# Patient Record
Sex: FEMALE | Race: WHITE | NOT HISPANIC OR LATINO | Employment: UNEMPLOYED | URBAN - METROPOLITAN AREA
[De-identification: names, ages, dates, MRNs, and addresses within clinical notes are randomized per-mention and may not be internally consistent; named-entity substitution may affect disease eponyms.]

---

## 2022-06-13 ENCOUNTER — OFFICE VISIT (OUTPATIENT)
Dept: FAMILY MEDICINE | Facility: CLINIC | Age: 67
End: 2022-06-13
Payer: COMMERCIAL

## 2022-06-13 ENCOUNTER — E-CONSULT (OUTPATIENT)
Dept: ENDOCRINOLOGY | Facility: CLINIC | Age: 67
End: 2022-06-13
Payer: COMMERCIAL

## 2022-06-13 VITALS
DIASTOLIC BLOOD PRESSURE: 76 MMHG | WEIGHT: 101 LBS | TEMPERATURE: 98 F | OXYGEN SATURATION: 98 % | HEIGHT: 62 IN | HEART RATE: 71 BPM | BODY MASS INDEX: 18.58 KG/M2 | SYSTOLIC BLOOD PRESSURE: 120 MMHG

## 2022-06-13 DIAGNOSIS — Z78.0 MENOPAUSE: ICD-10-CM

## 2022-06-13 DIAGNOSIS — Z00.00 ENCOUNTER FOR MEDICAL EXAMINATION TO ESTABLISH CARE: Primary | ICD-10-CM

## 2022-06-13 DIAGNOSIS — Z13.6 ENCOUNTER FOR LIPID SCREENING FOR CARDIOVASCULAR DISEASE: ICD-10-CM

## 2022-06-13 DIAGNOSIS — E27.8 ADRENAL HYPERPLASIA: ICD-10-CM

## 2022-06-13 DIAGNOSIS — L65.9 HAIR LOSS: ICD-10-CM

## 2022-06-13 DIAGNOSIS — E27.8 ADRENAL HYPERPLASIA: Primary | Chronic | ICD-10-CM

## 2022-06-13 DIAGNOSIS — Z13.220 ENCOUNTER FOR LIPID SCREENING FOR CARDIOVASCULAR DISEASE: ICD-10-CM

## 2022-06-13 DIAGNOSIS — Z23 NEED FOR VACCINATION: ICD-10-CM

## 2022-06-13 DIAGNOSIS — Z79.899 ENCOUNTER FOR LONG-TERM (CURRENT) USE OF MEDICATIONS: ICD-10-CM

## 2022-06-13 DIAGNOSIS — Z79.52 LONG TERM (CURRENT) USE OF SYSTEMIC STEROIDS: ICD-10-CM

## 2022-06-13 PROCEDURE — 1159F PR MEDICATION LIST DOCUMENTED IN MEDICAL RECORD: ICD-10-PCS | Mod: CPTII,S$GLB,, | Performed by: FAMILY MEDICINE

## 2022-06-13 PROCEDURE — 1160F RVW MEDS BY RX/DR IN RCRD: CPT | Mod: CPTII,S$GLB,, | Performed by: FAMILY MEDICINE

## 2022-06-13 PROCEDURE — 90471 IMMUNIZATION ADMIN: CPT | Mod: S$GLB,,, | Performed by: FAMILY MEDICINE

## 2022-06-13 PROCEDURE — 1159F MED LIST DOCD IN RCRD: CPT | Mod: CPTII,S$GLB,, | Performed by: FAMILY MEDICINE

## 2022-06-13 PROCEDURE — 99204 PR OFFICE/OUTPT VISIT, NEW, LEVL IV, 45-59 MIN: ICD-10-PCS | Mod: 25,S$GLB,, | Performed by: FAMILY MEDICINE

## 2022-06-13 PROCEDURE — 90715 TDAP VACCINE GREATER THAN OR EQUAL TO 7YO IM: ICD-10-PCS | Mod: S$GLB,,, | Performed by: FAMILY MEDICINE

## 2022-06-13 PROCEDURE — 1126F PR PAIN SEVERITY QUANTIFIED, NO PAIN PRESENT: ICD-10-PCS | Mod: CPTII,S$GLB,, | Performed by: FAMILY MEDICINE

## 2022-06-13 PROCEDURE — 99451 PR INTERPROF, PHONE/INTERNET/EHR, CONSULT, >= 5MINS: ICD-10-PCS | Mod: S$GLB,,, | Performed by: INTERNAL MEDICINE

## 2022-06-13 PROCEDURE — 3288F FALL RISK ASSESSMENT DOCD: CPT | Mod: CPTII,S$GLB,, | Performed by: FAMILY MEDICINE

## 2022-06-13 PROCEDURE — 3288F PR FALLS RISK ASSESSMENT DOCUMENTED: ICD-10-PCS | Mod: CPTII,S$GLB,, | Performed by: FAMILY MEDICINE

## 2022-06-13 PROCEDURE — 3008F PR BODY MASS INDEX (BMI) DOCUMENTED: ICD-10-PCS | Mod: CPTII,S$GLB,, | Performed by: FAMILY MEDICINE

## 2022-06-13 PROCEDURE — 99204 OFFICE O/P NEW MOD 45 MIN: CPT | Mod: 25,S$GLB,, | Performed by: FAMILY MEDICINE

## 2022-06-13 PROCEDURE — 1160F PR REVIEW ALL MEDS BY PRESCRIBER/CLIN PHARMACIST DOCUMENTED: ICD-10-PCS | Mod: CPTII,S$GLB,, | Performed by: FAMILY MEDICINE

## 2022-06-13 PROCEDURE — 3078F DIAST BP <80 MM HG: CPT | Mod: CPTII,S$GLB,, | Performed by: FAMILY MEDICINE

## 2022-06-13 PROCEDURE — 3008F BODY MASS INDEX DOCD: CPT | Mod: CPTII,S$GLB,, | Performed by: FAMILY MEDICINE

## 2022-06-13 PROCEDURE — 1126F AMNT PAIN NOTED NONE PRSNT: CPT | Mod: CPTII,S$GLB,, | Performed by: FAMILY MEDICINE

## 2022-06-13 PROCEDURE — 3074F SYST BP LT 130 MM HG: CPT | Mod: CPTII,S$GLB,, | Performed by: FAMILY MEDICINE

## 2022-06-13 PROCEDURE — 3074F PR MOST RECENT SYSTOLIC BLOOD PRESSURE < 130 MM HG: ICD-10-PCS | Mod: CPTII,S$GLB,, | Performed by: FAMILY MEDICINE

## 2022-06-13 PROCEDURE — 1101F PT FALLS ASSESS-DOCD LE1/YR: CPT | Mod: CPTII,S$GLB,, | Performed by: FAMILY MEDICINE

## 2022-06-13 PROCEDURE — 90715 TDAP VACCINE 7 YRS/> IM: CPT | Mod: S$GLB,,, | Performed by: FAMILY MEDICINE

## 2022-06-13 PROCEDURE — 90471 TDAP VACCINE GREATER THAN OR EQUAL TO 7YO IM: ICD-10-PCS | Mod: S$GLB,,, | Performed by: FAMILY MEDICINE

## 2022-06-13 PROCEDURE — 99999 PR PBB SHADOW E&M-NEW PATIENT-LVL V: CPT | Mod: PBBFAC,,, | Performed by: FAMILY MEDICINE

## 2022-06-13 PROCEDURE — 1101F PR PT FALLS ASSESS DOC 0-1 FALLS W/OUT INJ PAST YR: ICD-10-PCS | Mod: CPTII,S$GLB,, | Performed by: FAMILY MEDICINE

## 2022-06-13 PROCEDURE — 3078F PR MOST RECENT DIASTOLIC BLOOD PRESSURE < 80 MM HG: ICD-10-PCS | Mod: CPTII,S$GLB,, | Performed by: FAMILY MEDICINE

## 2022-06-13 PROCEDURE — 99999 PR PBB SHADOW E&M-NEW PATIENT-LVL V: ICD-10-PCS | Mod: PBBFAC,,, | Performed by: FAMILY MEDICINE

## 2022-06-13 PROCEDURE — 99451 NTRPROF PH1/NTRNET/EHR 5/>: CPT | Mod: S$GLB,,, | Performed by: INTERNAL MEDICINE

## 2022-06-13 RX ORDER — AMOXICILLIN 500 MG/1
500 CAPSULE ORAL EVERY 8 HOURS
COMMUNITY
End: 2023-06-13

## 2022-06-13 RX ORDER — PREDNISONE 5 MG/1
5 TABLET ORAL DAILY
COMMUNITY
End: 2022-06-13 | Stop reason: SDUPTHER

## 2022-06-13 RX ORDER — PREDNISONE 5 MG/1
5 TABLET ORAL DAILY
Qty: 90 TABLET | Refills: 4 | Status: SHIPPED | OUTPATIENT
Start: 2022-06-13 | End: 2023-06-13 | Stop reason: SDUPTHER

## 2022-06-13 NOTE — PROGRESS NOTES
PLAN:      Problem List Items Addressed This Visit     Long term (current) use of systemic steroids (Chronic)     Update bone density scan.  Discussed risk and benefits of long-term steroids.           Relevant Orders    ACTH    Testosterone    DHEA-SULFATE    ANDROSTENEDIONE    17-HYDROXYPROGESTERONE    Ambulatory referral/consult to Endocrinology    Menopause (Chronic)     Update bone density scan.           Relevant Orders    DXA Bone Density Spine And Hip    Adrenal hyperplasia (Chronic)     Patient agrees to E consult for congenital adrenal hyperplasia.  Consider labs and imaging to monitor this condition.  Continue prednisone.    Addendum:  E consult recommends in-person Endocrinology appointment.  Will check labs prior to this appointment.  Proceed with bone density scan given her long-term steroid usage.ACTH, DHEA-S, androstenedione, 17 hydroxy progesterone, and testosterone           Relevant Medications    predniSONE (DELTASONE) 5 MG tablet    Other Relevant Orders    ACTH    Testosterone    DHEA-SULFATE    ANDROSTENEDIONE    17-HYDROXYPROGESTERONE    Ambulatory referral/consult to Endocrinology    Encounter for long-term (current) use of medications (Chronic)     Update labs.Complete history and physical was completed today.  Complete and thorough medication reconciliation was performed.  Discussed risks and benefits of medications.  Advised patient on orders and health maintenance.  We discussed old records and old labs if available.  Will request any records not available through epic.  Continue current medications listed on your summary sheet.             Relevant Orders    CBC Without Differential    Comprehensive Metabolic Panel    TSH    Hemoglobin A1C    Lipid Panel    Encounter for medical examination to establish care - Primary     No records available at this time.  Requesting all records.Complete history and physical was completed today.  Complete and thorough medication reconciliation was  performed.  Discussed risks and benefits of medications.  Advised patient on orders and health maintenance.  We discussed old records and old labs if available.  Will request any records not available through epic.  Continue current medications listed on your summary sheet.             Relevant Orders    CBC Without Differential    Comprehensive Metabolic Panel    TSH    Hemoglobin A1C    Lipid Panel    Encounter for lipid screening for cardiovascular disease     Denies history of high cholesterol.  Update fasting lipid panel.           Relevant Orders    Lipid Panel    Need for vaccination     Update Tdap.           Relevant Orders    (In Office Administered) Tdap Vaccine (Completed)    Hair loss     Start biotin.  Referral to Dermatology for further evaluation and treatment hair loss.  Check thyroid labs.    Addendum:  Referral to Endocrinology for further evaluation.           Relevant Orders    Ambulatory referral/consult to Dermatology    ACTH    Testosterone    DHEA-SULFATE    ANDROSTENEDIONE    17-HYDROXYPROGESTERONE    Ambulatory referral/consult to Endocrinology        Future Appointments     Date Provider Specialty Appt Notes    6/14/2022  Radiology Menopause [    6/14/2022  Lab .    6/13/2023 Yash Gonzalez MD Family Medicine annual         Medication Management for assessment above:   Medication List with Changes/Refills   Current Medications    AMOXICILLIN (AMOXIL) 500 MG CAPSULE    Take 500 mg by mouth every 8 (eight) hours.   Changed and/or Refilled Medications    Modified Medication Previous Medication    PREDNISONE (DELTASONE) 5 MG TABLET predniSONE (DELTASONE) 5 MG tablet       Take 1 tablet (5 mg total) by mouth once daily.    Take 5 mg by mouth once daily.       Yash Gonzalez M.D.  ==========================================================================  Subjective:   Patient ID: Radha Jha is a 66 y.o. female.  has a past medical history of Adrenal hyperplasia, congenital.    Chief Complaint: Establish Care      Problem List Items Addressed This Visit     Long term (current) use of systemic steroids (Chronic)    Overview     Chronic.  Stable.  Patient reports she has been on 5 milligrams of prednisone for many years due to CAH.  She does report having abnormal bone density scan but is not take any medications for this.           Current Assessment & Plan     Update bone density scan.  Discussed risk and benefits of long-term steroids.           Menopause (Chronic)    Overview     Patient is due for bone density scan and is on chronic steroids.           Current Assessment & Plan     Update bone density scan.           Adrenal hyperplasia (Chronic)    Overview     Chronic.  Stable.  Patient reports compliance with prednisone 5 milligrams daily.  Patient reports that she was diagnosed in her teenage years with all the symptoms of CAH.  Patient had increased hair growth and lack of female characteristics.           Current Assessment & Plan     Patient agrees to E consult for congenital adrenal hyperplasia.  Consider labs and imaging to monitor this condition.  Continue prednisone.    Addendum:  E consult recommends in-person Endocrinology appointment.  Will check labs prior to this appointment.  Proceed with bone density scan given her long-term steroid usage.ACTH, DHEA-S, androstenedione, 17 hydroxy progesterone, and testosterone           Encounter for long-term (current) use of medications (Chronic)    Overview     CHRONIC. Stable. Compliant with medications for managed conditions. See medication list. No SE reported.   Routine lab analysis is being monitored. Refills were addressed.  No results found for: WBC, HGB, HCT, MCV, PLT      Chemistry    No results found for: NA, K, CL, CO2, BUN, CREATININE, GLU No results found for: CALCIUM, ALKPHOS, AST, ALT, BILITOT, ESTGFRAFRICA, EGFRNONAA       No results found for: TSH, I6TPZAH, Z5PJCGB, THYROIDAB, FREET4, T3FREE             Current  Assessment & Plan     Update labs.Complete history and physical was completed today.  Complete and thorough medication reconciliation was performed.  Discussed risks and benefits of medications.  Advised patient on orders and health maintenance.  We discussed old records and old labs if available.  Will request any records not available through epic.  Continue current medications listed on your summary sheet.             Encounter for medical examination to establish care - Primary    Overview     New patient.  [facility]  endo: Gutierrez Boogie,   Colon screening: cologdrew              Current Assessment & Plan     No records available at this time.  Requesting all records.Complete history and physical was completed today.  Complete and thorough medication reconciliation was performed.  Discussed risks and benefits of medications.  Advised patient on orders and health maintenance.  We discussed old records and old labs if available.  Will request any records not available through epic.  Continue current medications listed on your summary sheet.             Encounter for lipid screening for cardiovascular disease    Overview     No results found for: CHOL  No results found for: HDL  No results found for: LDLCALC  No results found for: TRIG  No results found for: CHOLHDL             Current Assessment & Plan     Denies history of high cholesterol.  Update fasting lipid panel.           Need for vaccination    Overview     Patient is due for tetanus shot.             Current Assessment & Plan     Update Tdap.           Hair loss    Overview     New problem.  Patient reports that she has been experiencing hair loss over the last few months.  She denies any change in medical history other than getting the COVID vaccination.  She denies getting a severe COVID infection.           Current Assessment & Plan     Start biotin.  Referral to Dermatology for further  "evaluation and treatment hair loss.  Check thyroid labs.    Addendum:  Referral to Endocrinology for further evaluation.                  Review of patient's allergies indicates:  No Known Allergies  Current Outpatient Medications   Medication Instructions    amoxicillin (AMOXIL) 500 mg, Oral, Every 8 hours    predniSONE (DELTASONE) 5 mg, Oral, Daily      I have reviewed the PMH, social history, FamilyHx, surgical history, allergies and medications documented / confirmed by the patient at the time of this visit.  Review of Systems   Constitutional: Negative for chills, fatigue, fever and unexpected weight change.   HENT: Negative for ear pain and sore throat.    Eyes: Negative for redness and visual disturbance.   Respiratory: Negative for cough and shortness of breath.    Cardiovascular: Negative for chest pain and palpitations.   Gastrointestinal: Negative for nausea and vomiting.   Endocrine:        Hair loss   Genitourinary: Negative for difficulty urinating and hematuria.   Musculoskeletal: Negative for arthralgias and myalgias.   Skin: Negative for rash and wound.   Neurological: Negative for weakness and headaches.   Psychiatric/Behavioral: Negative for sleep disturbance. The patient is not nervous/anxious.      Objective:   /76   Pulse 71   Temp 97.9 °F (36.6 °C) (Other (see comments))   Ht 5' 2" (1.575 m)   Wt 45.8 kg (101 lb)   SpO2 98%   BMI 18.47 kg/m²   Physical Exam  Vitals and nursing note reviewed.   Constitutional:       General: She is not in acute distress.     Appearance: She is well-developed. She is not ill-appearing, toxic-appearing or diaphoretic.   HENT:      Head: Normocephalic and atraumatic.      Right Ear: Hearing and external ear normal.      Left Ear: Hearing and external ear normal.      Nose: Nose normal. No rhinorrhea.   Eyes:      General: Lids are normal.      Extraocular Movements: Extraocular movements intact.      Conjunctiva/sclera: Conjunctivae normal.      " Pupils: Pupils are equal, round, and reactive to light.   Cardiovascular:      Rate and Rhythm: Normal rate.      Pulses: Normal pulses.   Pulmonary:      Effort: Pulmonary effort is normal. No respiratory distress.      Breath sounds: Normal breath sounds.   Musculoskeletal:         General: Normal range of motion.      Cervical back: Normal range of motion and neck supple.   Skin:     General: Skin is warm and dry.      Capillary Refill: Capillary refill takes less than 2 seconds.      Coloration: Skin is not pale.   Neurological:      General: No focal deficit present.      Mental Status: She is alert and oriented to person, place, and time. She is not disoriented.   Psychiatric:         Attention and Perception: She is attentive.         Mood and Affect: Mood normal. Mood is not anxious or depressed.         Speech: Speech is not rapid and pressured or slurred.         Behavior: Behavior normal. Behavior is not agitated, aggressive or hyperactive. Behavior is cooperative.         Thought Content: Thought content normal. Thought content is not paranoid or delusional. Thought content does not include homicidal or suicidal ideation. Thought content does not include homicidal or suicidal plan.         Cognition and Memory: Memory is not impaired.         Judgment: Judgment normal.         Assessment:     1. Encounter for medical examination to establish care    2. Encounter for lipid screening for cardiovascular disease    3. Encounter for long-term (current) use of medications    4. Adrenal hyperplasia    5. Long term (current) use of systemic steroids    6. Menopause    7. Hair loss    8. Need for vaccination      MDM:   Moderate to high complexity.  Moderate risk.  Total time: 59 minutes.  This includes total time spent on the encounter, which includes face to face time and non-face to face time preparing to see the patient (eg, review of previous medical records, tests), Obtaining and/or reviewing separately  obtained history, documenting clinical information in the electronic or other health record, independently interpreting results (not separately reported)/communicating results to the patient/family/caregiver, and/or care coordination (not separately reported).    I have Reviewed and summarized old records.  I have performed thorough medication reconciliation today and discussed risk and benefits of medications.  I have reviewed labs and discussed with patient.  All questions were answered.  I am requesting old records and will review them once they are available.    I have signed for the following orders AND/OR meds.  Orders Placed This Encounter   Procedures    DXA Bone Density Spine And Hip     Standing Status:   Future     Standing Expiration Date:   6/13/2025     Order Specific Question:   May the Radiologist modify the order per protocol to meet the clinical needs of the patient?     Answer:   Yes     Order Specific Question:   Release to patient     Answer:   Immediate    (In Office Administered) Tdap Vaccine    CBC Without Differential     Standing Status:   Future     Standing Expiration Date:   8/12/2023    Comprehensive Metabolic Panel     Standing Status:   Future     Standing Expiration Date:   8/12/2023    TSH     Standing Status:   Future     Standing Expiration Date:   8/12/2023    Hemoglobin A1C     Standing Status:   Future     Standing Expiration Date:   8/12/2023    Lipid Panel     Standing Status:   Future     Standing Expiration Date:   8/12/2023    ACTH     Standing Status:   Future     Standing Expiration Date:   8/13/2023    Testosterone     Standing Status:   Future     Standing Expiration Date:   8/13/2023    DHEA-SULFATE     Standing Status:   Future     Standing Expiration Date:   8/13/2023    ANDROSTENEDIONE     Standing Status:   Future     Standing Expiration Date:   8/13/2023    17-HYDROXYPROGESTERONE     Standing Status:   Future     Standing Expiration Date:   8/13/2023     Ambulatory referral/consult to Dermatology     Standing Status:   Future     Standing Expiration Date:   7/13/2023     Referral Priority:   Routine     Referral Type:   Consultation     Referral Reason:   Specialty Services Required     Requested Specialty:   Dermatology     Number of Visits Requested:   1    Ambulatory referral/consult to Endocrinology     Standing Status:   Future     Standing Expiration Date:   7/14/2023     Referral Priority:   Routine     Referral Type:   Consultation     Referred to Provider:   Sidra Mckeon MD     Requested Specialty:   Endocrinology     Number of Visits Requested:   1     Medications Ordered This Encounter   Medications    predniSONE (DELTASONE) 5 MG tablet     Sig: Take 1 tablet (5 mg total) by mouth once daily.     Dispense:  90 tablet     Refill:  4        Follow up in about 1 year (around 6/13/2023), or if symptoms worsen or fail to improve, for Annual Wellness Exam.  Future Appointments     Date Provider Specialty Appt Notes    6/14/2022  Radiology Menopause [    6/14/2022  Lab .    6/13/2023 Yash Gonzalez MD Family Medicine annual        If no improvement in symptoms or symptoms worsen, advised to call/follow-up at clinic or go to ER. Patient voiced understanding and all questions/concerns were addressed.   DISCLAIMER: This note was compiled by using a speech recognition dictation system and therefore please be aware that typographical / speech recognition errors can and do occur.  Please contact me if you see any errors specifically.    Yash Gonzalez M.D.       Office: [phone]   [address] [place] [address]  FAX: [phone]

## 2022-06-13 NOTE — Clinical Note
Please assist with setting up REferral to Endocrinology.  Let me know if any issues. See Econsult recommendations in chart.

## 2022-06-13 NOTE — PROGRESS NOTES
[facility]  Response for E-Consult     Patient Name: Radha Jha  MRN: [id number]  Primary Care Provider: Yash Gonzalez MD   Requesting Provider: Yash Gonzalez MD      Findings: 66 with the diagnosis of CAH.  Unclear the age of diagnosis.  Currently on prednisone 5 mg a day.  No labs to review.    Assessment and plan:     CAH..  Unfortunately managing of CAH is not straightforward.  There are Labs and symptoms to be factored in.  She would be best served by an endocrinology visit.  Dr. Mckeon is our expert.  Let me know if you need for me to help facilitate him an appointment.    I agree with the bone density it if you would like to order labs prior to her visit I would check an ACTH, DHEA-S, androstenedione, 17 hydroxy progesterone, and testosterone.  Please note that the goal is not to normalize these labs...  As normalization often leads to overtreatment    I did not speak to the requesting provider verbally about this.     Total time of Consultation: 10 minute    Percentage of time spent on verbal/written discussion: 100%     Thank you for your consult.     MD [facility]

## 2022-06-13 NOTE — ASSESSMENT & PLAN NOTE
Patient agrees to E consult for congenital adrenal hyperplasia.  Consider labs and imaging to monitor this condition.  Continue prednisone.    Addendum:  E consult recommends in-person Endocrinology appointment.  Will check labs prior to this appointment.  Proceed with bone density scan given her long-term steroid usage.ACTH, DHEA-S, androstenedione, 17 hydroxy progesterone, and testosterone

## 2022-06-13 NOTE — ASSESSMENT & PLAN NOTE
No records available at this time.  Requesting all records.Complete history and physical was completed today.  Complete and thorough medication reconciliation was performed.  Discussed risks and benefits of medications.  Advised patient on orders and health maintenance.  We discussed old records and old labs if available.  Will request any records not available through epic.  Continue current medications listed on your summary sheet.

## 2022-06-13 NOTE — PATIENT INSTRUCTIONS
Follow up in about 1 year (around 6/13/2023), or if symptoms worsen or fail to improve, for Annual Wellness Exam.     Dear patient,   As a result of recent federal legislation (The Federal Cures Act), you may receive lab or pathology results from your visit in your [facility] account before your physician is able to contact you. Your physician or their representative will relay the results to you with their recommendations at their soonest availability.     If no improvement in symptoms or symptoms worsen, please be advised to call MD, follow-up at clinic and/or go to ER if becomes severe.    Yash Gonzalez M.D.        We Offer TELEHEALTH & Same Day Appointments!   Book your Telehealth appointment with me through my nurse or   Clinic appointments on [employer]!    [address] [place] [address]    Office: [phone]   FAX: [phone]    Check out my Facebook Page and Follow Me at: [email or web address]    Check out my website at [employer] by clicking on: [email or web address]    To Schedule appointments online, go to [employer]: [email or web address]

## 2022-06-13 NOTE — ASSESSMENT & PLAN NOTE
Start biotin.  Referral to Dermatology for further evaluation and treatment hair loss.  Check thyroid labs.    Addendum:  Referral to Endocrinology for further evaluation.

## 2022-06-14 ENCOUNTER — HOSPITAL ENCOUNTER (OUTPATIENT)
Dept: RADIOLOGY | Facility: HOSPITAL | Age: 67
Discharge: HOME | End: 2022-06-14
Attending: FAMILY MEDICINE
Payer: COMMERCIAL

## 2022-06-14 ENCOUNTER — PATIENT MESSAGE (OUTPATIENT)
Dept: FAMILY MEDICINE | Facility: CLINIC | Age: 67
End: 2022-06-14
Payer: COMMERCIAL

## 2022-06-14 DIAGNOSIS — Z78.0 MENOPAUSE: ICD-10-CM

## 2022-06-14 PROCEDURE — 77080 DEXA BONE DENSITY SPINE HIP: ICD-10-PCS | Mod: 26,,, | Performed by: RADIOLOGY

## 2022-06-14 PROCEDURE — 77080 DXA BONE DENSITY AXIAL: CPT | Mod: 26,,, | Performed by: RADIOLOGY

## 2022-06-14 PROCEDURE — 77080 DXA BONE DENSITY AXIAL: CPT | Mod: TC,PO

## 2022-06-14 NOTE — PROGRESS NOTES
1st check to see if patient has seen the results.  If not then  CALL patient with results and Document verification.  Schedule follow-up if needed.  [phone]  Bone density scan indicates osteoporosis.  This is likely a result of the long-term steroids/prednisone that you have been on for treatment of CAH.  It is very important that we treat this at least with calcium and vitamin-D and possibly a prescription medication.  I recommend that you establish care with Endocrinology and come up with a treatment plan for osteoporosis.  Increase weight-bearing exercise also this will help strengthen your bones.

## 2022-06-14 NOTE — PROGRESS NOTES
Appt scheduled.  Attempted to call patient, no answer, left vm asking patient to call the office.

## 2022-06-14 NOTE — PROGRESS NOTES
Scheduled appointment, attempted to reach patient, no answer so I LVM asking patient to call the office.

## 2022-06-15 DIAGNOSIS — Z12.31 OTHER SCREENING MAMMOGRAM: ICD-10-CM

## 2022-06-16 ENCOUNTER — PATIENT MESSAGE (OUTPATIENT)
Dept: FAMILY MEDICINE | Facility: CLINIC | Age: 67
End: 2022-06-16
Payer: COMMERCIAL

## 2022-06-17 ENCOUNTER — PATIENT MESSAGE (OUTPATIENT)
Dept: ADMINISTRATIVE | Facility: HOSPITAL | Age: 67
End: 2022-06-17
Payer: COMMERCIAL

## 2022-06-22 ENCOUNTER — OFFICE VISIT (OUTPATIENT)
Dept: ENDOCRINOLOGY | Facility: CLINIC | Age: 67
End: 2022-06-22
Payer: COMMERCIAL

## 2022-06-22 VITALS
SYSTOLIC BLOOD PRESSURE: 146 MMHG | HEART RATE: 68 BPM | OXYGEN SATURATION: 99 % | WEIGHT: 101 LBS | DIASTOLIC BLOOD PRESSURE: 86 MMHG | RESPIRATION RATE: 16 BRPM | BODY MASS INDEX: 18.47 KG/M2

## 2022-06-22 DIAGNOSIS — L65.9 HAIR LOSS: ICD-10-CM

## 2022-06-22 DIAGNOSIS — Z79.52 LONG TERM (CURRENT) USE OF SYSTEMIC STEROIDS: ICD-10-CM

## 2022-06-22 DIAGNOSIS — M81.0 OSTEOPOROSIS, UNSPECIFIED OSTEOPOROSIS TYPE, UNSPECIFIED PATHOLOGICAL FRACTURE PRESENCE: Primary | ICD-10-CM

## 2022-06-22 DIAGNOSIS — E27.8 ADRENAL HYPERPLASIA: ICD-10-CM

## 2022-06-22 DIAGNOSIS — M81.0 AGE-RELATED OSTEOPOROSIS WITHOUT CURRENT PATHOLOGICAL FRACTURE: ICD-10-CM

## 2022-06-22 DIAGNOSIS — E25.0 CONGENITAL ADRENAL HYPERPLASIA: ICD-10-CM

## 2022-06-22 PROCEDURE — 3044F HG A1C LEVEL LT 7.0%: CPT | Mod: CPTII,S$GLB,, | Performed by: INTERNAL MEDICINE

## 2022-06-22 PROCEDURE — 3077F SYST BP >= 140 MM HG: CPT | Mod: CPTII,S$GLB,, | Performed by: INTERNAL MEDICINE

## 2022-06-22 PROCEDURE — 1126F AMNT PAIN NOTED NONE PRSNT: CPT | Mod: CPTII,S$GLB,, | Performed by: INTERNAL MEDICINE

## 2022-06-22 PROCEDURE — 99999 PR PBB SHADOW E&M-EST. PATIENT-LVL IV: ICD-10-PCS | Mod: PBBFAC,,, | Performed by: INTERNAL MEDICINE

## 2022-06-22 PROCEDURE — 99204 OFFICE O/P NEW MOD 45 MIN: CPT | Mod: S$GLB,,, | Performed by: INTERNAL MEDICINE

## 2022-06-22 PROCEDURE — 3079F DIAST BP 80-89 MM HG: CPT | Mod: CPTII,S$GLB,, | Performed by: INTERNAL MEDICINE

## 2022-06-22 PROCEDURE — 1159F MED LIST DOCD IN RCRD: CPT | Mod: CPTII,S$GLB,, | Performed by: INTERNAL MEDICINE

## 2022-06-22 PROCEDURE — 3008F BODY MASS INDEX DOCD: CPT | Mod: CPTII,S$GLB,, | Performed by: INTERNAL MEDICINE

## 2022-06-22 PROCEDURE — 1126F PR PAIN SEVERITY QUANTIFIED, NO PAIN PRESENT: ICD-10-PCS | Mod: CPTII,S$GLB,, | Performed by: INTERNAL MEDICINE

## 2022-06-22 PROCEDURE — 99204 PR OFFICE/OUTPT VISIT, NEW, LEVL IV, 45-59 MIN: ICD-10-PCS | Mod: S$GLB,,, | Performed by: INTERNAL MEDICINE

## 2022-06-22 PROCEDURE — 3079F PR MOST RECENT DIASTOLIC BLOOD PRESSURE 80-89 MM HG: ICD-10-PCS | Mod: CPTII,S$GLB,, | Performed by: INTERNAL MEDICINE

## 2022-06-22 PROCEDURE — 3077F PR MOST RECENT SYSTOLIC BLOOD PRESSURE >= 140 MM HG: ICD-10-PCS | Mod: CPTII,S$GLB,, | Performed by: INTERNAL MEDICINE

## 2022-06-22 PROCEDURE — 3044F PR MOST RECENT HEMOGLOBIN A1C LEVEL <7.0%: ICD-10-PCS | Mod: CPTII,S$GLB,, | Performed by: INTERNAL MEDICINE

## 2022-06-22 PROCEDURE — 99999 PR PBB SHADOW E&M-EST. PATIENT-LVL IV: CPT | Mod: PBBFAC,,, | Performed by: INTERNAL MEDICINE

## 2022-06-22 PROCEDURE — 3008F PR BODY MASS INDEX (BMI) DOCUMENTED: ICD-10-PCS | Mod: CPTII,S$GLB,, | Performed by: INTERNAL MEDICINE

## 2022-06-22 PROCEDURE — 1159F PR MEDICATION LIST DOCUMENTED IN MEDICAL RECORD: ICD-10-PCS | Mod: CPTII,S$GLB,, | Performed by: INTERNAL MEDICINE

## 2022-06-22 NOTE — PROGRESS NOTES
Subjective:      Patient ID: Radha Jha is a 66 y.o. female.    Chief Complaint:  Adrenal Insufficiency      History of Present Illness    Diagnosed with classic congenital adrenal hyperplasia at age 17 years.   Puberty (terminal hair, acne and sexual hair), clitoromegaly in the third grade, absence of menstrual cycles and acne.    Never had any reconstructive surgery.     Initially managed with high doses of prednisone. Then decreased to prednisone 7.5 mg daily.   Never used florinef.    Started having menstrual cycles following high doses of steroids.   Four pregnancies, and two live births.   Used the same dose of prednisone 7.5 mg daily     Denies falls or fractures. Denies kidney stones.  Denies diabetes.   Denies HTN.    Exercises daily     Medications:  Prednisone 5 mg --> less than 10 years  Calcium gummies three daily   MVI with vitamin D and biotin    PMHx:  No other medical history   White coat hypertension     Medications:  None     Denies osteoporosis in the family     Review of Systems  No recent illness     Objective:   Physical Exam  Constitutional:       General: She is not in acute distress.     Appearance: She is well-developed.   Eyes:      General: No scleral icterus.     Conjunctiva/sclera: Conjunctivae normal.      Pupils: Pupils are equal, round, and reactive to light.      Comments: No proptosis.    Neck:      Thyroid: No thyromegaly.      Trachea: No tracheal deviation.   Cardiovascular:      Rate and Rhythm: Normal rate.   Pulmonary:      Effort: Pulmonary effort is normal.      Breath sounds: Normal breath sounds.   Musculoskeletal:      Cervical back: Normal range of motion and neck supple.   Lymphadenopathy:      Cervical: No cervical adenopathy.   Skin:     General: Skin is warm and dry.      Findings: No rash.   Neurological:      Mental Status: She is alert and oriented to person, place, and time.      Deep Tendon Reflexes: Reflexes are normal and symmetric.      Comments: No  tremor.       Vitals:    06/22/22 1307   BP: (!) 146/86   Pulse: 68   Resp: 16   SpO2: 99%   Weight: 45.8 kg (101 lb)       BP Readings from Last 3 Encounters:   06/22/22 (!) 146/86   06/13/22 120/76     Wt Readings from Last 1 Encounters:   06/22/22 1307 45.8 kg (101 lb)         Body mass index is 18.47 kg/m².    Lab Review:   Lab Results   Component Value Date    HGBA1C 5.4 06/14/2022     Lab Results   Component Value Date    CHOL 180 06/14/2022    HDL 54 06/14/2022    LDLCALC 100.2 06/14/2022    TRIG 129 06/14/2022    CHOLHDL 30.0 06/14/2022     Lab Results   Component Value Date     06/14/2022    K 3.7 06/14/2022     06/14/2022    CO2 24 06/14/2022    GLU 95 06/14/2022    BUN 13 06/14/2022    CREATININE 0.9 06/14/2022    CALCIUM 8.9 06/14/2022    PROT 6.8 06/14/2022    ALBUMIN 4.1 06/14/2022    BILITOT 0.4 06/14/2022    ALKPHOS 64 06/14/2022    AST 37 06/14/2022    ALT 32 06/14/2022    ANIONGAP 14 06/14/2022    ESTGFRAFRICA >60.0 06/14/2022    EGFRNONAA >60.0 06/14/2022    TSH 3.643 06/14/2022      Latest Reference Range & Units 06/14/22 08:04   DHEA-SO4 33.6 - 78.9 ug/dL 68.1   Testosterone, Total 5 - 73 ng/dL 101 (H)      Latest Reference Range & Units 06/14/22 08:04   17-Hydroxyprogesterone 32 - 272 ng/dL 4,336 (H)   Androstenedione ng/dL 480 (H)         Assessment and Plan     Congenital adrenal hyperplasia  History suggests CAH simple virilizing form   Diagnosed based on: premature puberache, accelerated growth with shorter than expected height as adult, biochemical data. Patient uncllear if she had genetic testing at age 17 years     Used prednisone for many years.     PLAN:  1) wants to continue pred 5 mg daily, ideally would switch to HC due to shorter half life.   2) discussed effects of steroids on bones. Discussed sick day rules and medic alert tag  3) check renin levels along with vitamin D  4) have recommended treatment for osteoporosis, will send message to Dr. Gonzalez  5) check  vitamin D and 24 hr urine collection for ca/creat    Long term (current) use of systemic steroids  See #1    Age-related osteoporosis without current pathological fracture  Risk: lifelong steroid use, low BMI/body weight,   Check vitamin D and 24 hr urine ca/creatinine   Discussed diet and supplements --> healthy and takes D and Ca  No falls or fractures     Consider actonel, if cannot tolerate can switch to reclast v prolia

## 2022-06-22 NOTE — ASSESSMENT & PLAN NOTE
History suggests CAH simple virilizing form   Diagnosed based on: premature puberache, accelerated growth with shorter than expected height as adult, biochemical data. Patient uncllear if she had genetic testing at age 17 years     Used prednisone for many years.     PLAN:  1) wants to continue pred 5 mg daily, ideally would switch to HC due to shorter half life.   2) discussed effects of steroids on bones. Discussed sick day rules and medic alert tag  3) check renin levels along with vitamin D  4) have recommended treatment for osteoporosis, will send message to Dr. Gonzalez  5) check vitamin D and 24 hr urine collection for ca/creat

## 2022-06-22 NOTE — PATIENT INSTRUCTIONS
"Osteoporosis medications  These are the medications we discussed for osteoporosis treatment:    - Bisphosphonates:         - these slow down bone loss         - oral forms (Fosamax and Actonel are examples) - taken once weekly or once monthly         - IV form (Reclast) - given intravenously once yearly    - Prolia (denosumab):          - slows down bone loss           - it is a subcutaneous injection (under the skin) every 6 months, given in the office    - Forteo (teriparatide) or Tymlos (abaloparatide):           - medications that "build bone" or increases bone formation           - subcutaneous injection (under the skin) taken once daily that you self-administer at home    For more information on medications: https://www.nof.org/patients/treatment/medicationadherence/      Adrenal insufficiency self-care instructions and sick day rules     Adrenal insufficiency, which involves a deficiency in steroid hormones that are normally produced by the body, can result in symptoms that include generalized and severe fatigue, malaise, dizziness, and low blood pressure. Should you develop any of these symptoms, please call us immediately or go to the ER if severe symptoms develop. You may also take your emergency intramuscular dexamethasone injection if you have this available    Please refer to the following instructions for patients with adrenal insufficiency:  1. Please get an alert bracelet that says "Adrenal insufficiency. Give stress doses of steroids in case of illness."     2. If you become nauseous and are unable to keep hydrocortisone down, you need to go to an emergency room to get this medication via IV.     3. If you are ill with a low-grade fever of  F, please double your dose of hydrocortisone. If you have a fever of >100 F, please triple your hydrocortisone dose for 3 days or until fever resolves.     4. If you are undergoing a planned medical procedure (such as minor surgery, colonoscopy) or a major " dental procedure (tooth extraction, root canal etc) you should double your dose the day before and the day of and the day after the procedure.    5. If a major surgery requiring general anesthesia is being planned, please notify your endocrinologist so that we can give instructions to the anesthesia team that will be taking care of you with regards to the amount of hydrocortisone to be given during surgery.    Please call us with any questions and to notify us that you are ill at home or are heading to ER/hospital so that we can help you through the situation and communicate with the physicians taking care of you.

## 2022-06-22 NOTE — ASSESSMENT & PLAN NOTE
Risk: lifelong steroid use, low BMI/body weight,   Check vitamin D and 24 hr urine ca/creatinine   Discussed diet and supplements --> healthy and takes D and Ca  No falls or fractures     Consider actonel, if cannot tolerate can switch to reclast v prolia

## 2022-06-24 ENCOUNTER — LAB VISIT (OUTPATIENT)
Dept: LAB | Facility: HOSPITAL | Age: 67
End: 2022-06-24
Attending: INTERNAL MEDICINE
Payer: COMMERCIAL

## 2022-06-24 DIAGNOSIS — E25.0 CONGENITAL ADRENAL HYPERPLASIA: ICD-10-CM

## 2022-06-24 DIAGNOSIS — M81.0 OSTEOPOROSIS, UNSPECIFIED OSTEOPOROSIS TYPE, UNSPECIFIED PATHOLOGICAL FRACTURE PRESENCE: ICD-10-CM

## 2022-06-24 DIAGNOSIS — Z79.52 LONG TERM (CURRENT) USE OF SYSTEMIC STEROIDS: ICD-10-CM

## 2022-06-24 LAB — 25(OH)D3+25(OH)D2 SERPL-MCNC: 48 NG/ML (ref 30–96)

## 2022-06-24 PROCEDURE — 82306 VITAMIN D 25 HYDROXY: CPT | Performed by: INTERNAL MEDICINE

## 2022-06-24 PROCEDURE — 84244 ASSAY OF RENIN: CPT | Performed by: INTERNAL MEDICINE

## 2022-06-24 RX ORDER — RISEDRONATE SODIUM 150 MG/1
TABLET, FILM COATED ORAL
Qty: 1 TABLET | Refills: 11 | Status: SHIPPED | OUTPATIENT
Start: 2022-06-24

## 2022-06-26 ENCOUNTER — PATIENT MESSAGE (OUTPATIENT)
Dept: ENDOCRINOLOGY | Facility: CLINIC | Age: 67
End: 2022-06-26
Payer: COMMERCIAL

## 2022-06-30 LAB — RENIN PLAS-CCNC: 7.1 NG/ML/H

## 2022-07-04 ENCOUNTER — PATIENT MESSAGE (OUTPATIENT)
Dept: ENDOCRINOLOGY | Facility: CLINIC | Age: 67
End: 2022-07-04
Payer: COMMERCIAL

## 2022-07-05 ENCOUNTER — PATIENT MESSAGE (OUTPATIENT)
Dept: ENDOCRINOLOGY | Facility: CLINIC | Age: 67
End: 2022-07-05
Payer: COMMERCIAL

## 2022-09-12 PROBLEM — Z13.6 ENCOUNTER FOR LIPID SCREENING FOR CARDIOVASCULAR DISEASE: Status: RESOLVED | Noted: 2022-06-13 | Resolved: 2022-09-12

## 2022-09-12 PROBLEM — Z00.00 ENCOUNTER FOR MEDICAL EXAMINATION TO ESTABLISH CARE: Status: RESOLVED | Noted: 2022-06-13 | Resolved: 2022-09-12

## 2022-09-12 PROBLEM — Z13.220 ENCOUNTER FOR LIPID SCREENING FOR CARDIOVASCULAR DISEASE: Status: RESOLVED | Noted: 2022-06-13 | Resolved: 2022-09-12

## 2022-12-20 DIAGNOSIS — Z12.31 OTHER SCREENING MAMMOGRAM: ICD-10-CM

## 2023-01-25 ENCOUNTER — PATIENT MESSAGE (OUTPATIENT)
Dept: ADMINISTRATIVE | Facility: HOSPITAL | Age: 68
End: 2023-01-25
Payer: COMMERCIAL

## 2023-06-13 ENCOUNTER — OFFICE VISIT (OUTPATIENT)
Dept: FAMILY MEDICINE | Facility: CLINIC | Age: 68
End: 2023-06-13
Payer: COMMERCIAL

## 2023-06-13 ENCOUNTER — LAB VISIT (OUTPATIENT)
Dept: LAB | Facility: HOSPITAL | Age: 68
End: 2023-06-13
Attending: FAMILY MEDICINE
Payer: COMMERCIAL

## 2023-06-13 VITALS
OXYGEN SATURATION: 98 % | DIASTOLIC BLOOD PRESSURE: 76 MMHG | HEIGHT: 62 IN | BODY MASS INDEX: 19.14 KG/M2 | HEART RATE: 79 BPM | SYSTOLIC BLOOD PRESSURE: 130 MMHG | WEIGHT: 104 LBS

## 2023-06-13 DIAGNOSIS — Z79.899 ENCOUNTER FOR LONG-TERM (CURRENT) USE OF MEDICATIONS: ICD-10-CM

## 2023-06-13 DIAGNOSIS — E27.8 ADRENAL HYPERPLASIA: ICD-10-CM

## 2023-06-13 DIAGNOSIS — Z13.220 ENCOUNTER FOR LIPID SCREENING FOR CARDIOVASCULAR DISEASE: ICD-10-CM

## 2023-06-13 DIAGNOSIS — Z11.59 NEED FOR HEPATITIS C SCREENING TEST: ICD-10-CM

## 2023-06-13 DIAGNOSIS — Z13.6 ENCOUNTER FOR LIPID SCREENING FOR CARDIOVASCULAR DISEASE: ICD-10-CM

## 2023-06-13 DIAGNOSIS — Z00.00 WELL ADULT EXAM: Primary | ICD-10-CM

## 2023-06-13 DIAGNOSIS — Z00.00 WELL ADULT EXAM: ICD-10-CM

## 2023-06-13 LAB
ALBUMIN SERPL BCP-MCNC: 4 G/DL (ref 3.5–5.2)
ALP SERPL-CCNC: 64 U/L (ref 55–135)
ALT SERPL W/O P-5'-P-CCNC: 32 U/L (ref 10–44)
ANION GAP SERPL CALC-SCNC: 12 MMOL/L (ref 8–16)
AST SERPL-CCNC: 30 U/L (ref 10–40)
BILIRUB SERPL-MCNC: 0.3 MG/DL (ref 0.1–1)
BUN SERPL-MCNC: 12 MG/DL (ref 8–23)
CALCIUM SERPL-MCNC: 9.4 MG/DL (ref 8.7–10.5)
CHLORIDE SERPL-SCNC: 106 MMOL/L (ref 95–110)
CHOLEST SERPL-MCNC: 193 MG/DL (ref 120–199)
CHOLEST/HDLC SERPL: 3.6 {RATIO} (ref 2–5)
CO2 SERPL-SCNC: 24 MMOL/L (ref 23–29)
CREAT SERPL-MCNC: 0.9 MG/DL (ref 0.5–1.4)
ERYTHROCYTE [DISTWIDTH] IN BLOOD BY AUTOMATED COUNT: 12.6 % (ref 11.5–14.5)
EST. GFR  (NO RACE VARIABLE): >60 ML/MIN/1.73 M^2
ESTIMATED AVG GLUCOSE: 105 MG/DL (ref 68–131)
GLUCOSE SERPL-MCNC: 97 MG/DL (ref 70–110)
HBA1C MFR BLD: 5.3 % (ref 4–5.6)
HCT VFR BLD AUTO: 42.6 % (ref 37–48.5)
HCV AB SERPL QL IA: REACTIVE
HDLC SERPL-MCNC: 54 MG/DL (ref 40–75)
HDLC SERPL: 28 % (ref 20–50)
HGB BLD-MCNC: 14.5 G/DL (ref 12–16)
LDLC SERPL CALC-MCNC: 111 MG/DL (ref 63–159)
MCH RBC QN AUTO: 32.2 PG (ref 27–31)
MCHC RBC AUTO-ENTMCNC: 34 G/DL (ref 32–36)
MCV RBC AUTO: 95 FL (ref 82–98)
NONHDLC SERPL-MCNC: 139 MG/DL
PLATELET # BLD AUTO: 258 K/UL (ref 150–450)
PMV BLD AUTO: 10 FL (ref 9.2–12.9)
POTASSIUM SERPL-SCNC: 3.6 MMOL/L (ref 3.5–5.1)
PROT SERPL-MCNC: 7.1 G/DL (ref 6–8.4)
RBC # BLD AUTO: 4.51 M/UL (ref 4–5.4)
SODIUM SERPL-SCNC: 142 MMOL/L (ref 136–145)
TRIGL SERPL-MCNC: 140 MG/DL (ref 30–150)
TSH SERPL DL<=0.005 MIU/L-ACNC: 1.84 UIU/ML (ref 0.4–4)
WBC # BLD AUTO: 8.97 K/UL (ref 3.9–12.7)

## 2023-06-13 PROCEDURE — 1160F RVW MEDS BY RX/DR IN RCRD: CPT | Mod: CPTII,S$GLB,, | Performed by: FAMILY MEDICINE

## 2023-06-13 PROCEDURE — 1126F AMNT PAIN NOTED NONE PRSNT: CPT | Mod: CPTII,S$GLB,, | Performed by: FAMILY MEDICINE

## 2023-06-13 PROCEDURE — 3288F FALL RISK ASSESSMENT DOCD: CPT | Mod: CPTII,S$GLB,, | Performed by: FAMILY MEDICINE

## 2023-06-13 PROCEDURE — 80061 LIPID PANEL: CPT | Performed by: FAMILY MEDICINE

## 2023-06-13 PROCEDURE — 1159F MED LIST DOCD IN RCRD: CPT | Mod: CPTII,S$GLB,, | Performed by: FAMILY MEDICINE

## 2023-06-13 PROCEDURE — 3078F DIAST BP <80 MM HG: CPT | Mod: CPTII,S$GLB,, | Performed by: FAMILY MEDICINE

## 2023-06-13 PROCEDURE — 99397 PR PREVENTIVE VISIT,EST,65 & OVER: ICD-10-PCS | Mod: S$GLB,,, | Performed by: FAMILY MEDICINE

## 2023-06-13 PROCEDURE — 86803 HEPATITIS C AB TEST: CPT | Performed by: FAMILY MEDICINE

## 2023-06-13 PROCEDURE — 3075F SYST BP GE 130 - 139MM HG: CPT | Mod: CPTII,S$GLB,, | Performed by: FAMILY MEDICINE

## 2023-06-13 PROCEDURE — 3075F PR MOST RECENT SYSTOLIC BLOOD PRESS GE 130-139MM HG: ICD-10-PCS | Mod: CPTII,S$GLB,, | Performed by: FAMILY MEDICINE

## 2023-06-13 PROCEDURE — 3288F PR FALLS RISK ASSESSMENT DOCUMENTED: ICD-10-PCS | Mod: CPTII,S$GLB,, | Performed by: FAMILY MEDICINE

## 2023-06-13 PROCEDURE — 84443 ASSAY THYROID STIM HORMONE: CPT | Performed by: FAMILY MEDICINE

## 2023-06-13 PROCEDURE — 1126F PR PAIN SEVERITY QUANTIFIED, NO PAIN PRESENT: ICD-10-PCS | Mod: CPTII,S$GLB,, | Performed by: FAMILY MEDICINE

## 2023-06-13 PROCEDURE — 80053 COMPREHEN METABOLIC PANEL: CPT | Performed by: FAMILY MEDICINE

## 2023-06-13 PROCEDURE — 99999 PR PBB SHADOW E&M-EST. PATIENT-LVL IV: CPT | Mod: PBBFAC,,, | Performed by: FAMILY MEDICINE

## 2023-06-13 PROCEDURE — 36415 COLL VENOUS BLD VENIPUNCTURE: CPT | Mod: PO | Performed by: FAMILY MEDICINE

## 2023-06-13 PROCEDURE — 1159F PR MEDICATION LIST DOCUMENTED IN MEDICAL RECORD: ICD-10-PCS | Mod: CPTII,S$GLB,, | Performed by: FAMILY MEDICINE

## 2023-06-13 PROCEDURE — 99397 PER PM REEVAL EST PAT 65+ YR: CPT | Mod: S$GLB,,, | Performed by: FAMILY MEDICINE

## 2023-06-13 PROCEDURE — 1101F PR PT FALLS ASSESS DOC 0-1 FALLS W/OUT INJ PAST YR: ICD-10-PCS | Mod: CPTII,S$GLB,, | Performed by: FAMILY MEDICINE

## 2023-06-13 PROCEDURE — 1160F PR REVIEW ALL MEDS BY PRESCRIBER/CLIN PHARMACIST DOCUMENTED: ICD-10-PCS | Mod: CPTII,S$GLB,, | Performed by: FAMILY MEDICINE

## 2023-06-13 PROCEDURE — 1101F PT FALLS ASSESS-DOCD LE1/YR: CPT | Mod: CPTII,S$GLB,, | Performed by: FAMILY MEDICINE

## 2023-06-13 PROCEDURE — 85027 COMPLETE CBC AUTOMATED: CPT | Performed by: FAMILY MEDICINE

## 2023-06-13 PROCEDURE — 99999 PR PBB SHADOW E&M-EST. PATIENT-LVL IV: ICD-10-PCS | Mod: PBBFAC,,, | Performed by: FAMILY MEDICINE

## 2023-06-13 PROCEDURE — 3008F BODY MASS INDEX DOCD: CPT | Mod: CPTII,S$GLB,, | Performed by: FAMILY MEDICINE

## 2023-06-13 PROCEDURE — 3078F PR MOST RECENT DIASTOLIC BLOOD PRESSURE < 80 MM HG: ICD-10-PCS | Mod: CPTII,S$GLB,, | Performed by: FAMILY MEDICINE

## 2023-06-13 PROCEDURE — 83036 HEMOGLOBIN GLYCOSYLATED A1C: CPT | Performed by: FAMILY MEDICINE

## 2023-06-13 PROCEDURE — 3008F PR BODY MASS INDEX (BMI) DOCUMENTED: ICD-10-PCS | Mod: CPTII,S$GLB,, | Performed by: FAMILY MEDICINE

## 2023-06-13 RX ORDER — PREDNISONE 5 MG/1
5 TABLET ORAL DAILY
Qty: 90 TABLET | Refills: 4 | Status: SHIPPED | OUTPATIENT
Start: 2023-06-13

## 2023-06-13 NOTE — PROGRESS NOTES
This note is specifically for wellness visit performed today.   WELLNESS EXAM      Patient ID: Radha Jha is a 67 y.o. female with  has a past medical history of Adrenal hyperplasia, congenital.   Chief Complaint:  Encounter for wellness exam    Well Adult Physical: Patient here for a comprehensive physical exam.The patient reports Chronic problems.    June 2023:  Patient reports that she is doing well.  She continues on prednisone for adrenal hyperplasia that is congenital.  No issues.  She is on Actonel for osteoporosis.  Following with Endocrinology.  DXA Bone Density Spine And Hip  Narrative: EXAMINATION:  DEXA BONE DENSITY SPINE HIP    CLINICAL HISTORY:  Asymptomatic menopausal state    TECHNIQUE:  DXA scanning was performed over the left hip and lumbar spine.  Review of the images confirms satisfactory positioning and technique.    COMPARISON:  None    FINDINGS:  The L1 to L4 vertebral bone mineral density is equal to 1.03 g/cm squared with a T score of -1.2.    The left femoral neck bone mineral density is equal to 0.65 g/cm squared with a T score of -2.8.    The total hip bone mineral density is equal to 0.64 g/cm squared with a T score of -2.9.  Impression: Osteoporosis    Consider FDA approved medical therapies in postmenopausal women and men aged 50 years and older, based on the following:    *A hip or vertebral (clinical or morphometric) fracture  *T score less than or equal to -2.5 at the femoral neck or spine after appropriate evaluation to exclude secondary causes.  *Low bone mass -- also known as osteopenia (T score between -1.0 and -2.5 at the femoral neck or spine) and a 10 year probability of hip fracture greater than or equal to 3% or a 10 year probability of major osteoporosis-related fracture greater than or equal to 20% based on the [place]-adapted WHO algorithm.  *Clinicians judgment and/or patient preference may indicate treatment for people with 10 year fracture probabilities is above or  below these levels.    Electronically signed by: Alberto Rubio MD  Date:    06/14/2022  Time:    08:26    Reviewed Care team:prev pcp: Claude W. Gilbreath, MD  endo: Gutierrez Boogie DO  Colon screening: cologuard   Do you take any herbs or supplements that were not prescribed by a doctor? no   Are you taking calcium supplements? no    History: OBGYN:   LMP: No LMP recorded. Patient is postmenopausal.   MMG:  No relevant family history has been documented.  Patient declines further testing for this condition.  Patient understands risk and benefits of her decision.  PAP: No result found patient declines further testing for this condition understands risk and benefits of her decision.  Colon cancer screening:   Patient declines further testing.  She has had Cologuard several years ago.    Health Maintenance Topics with due status: Not Due       Topic Last Completion Date    TETANUS VACCINE 06/13/2022    Lipid Panel 06/14/2022    DEXA Scan 06/14/2022    Influenza Vaccine Not Due      ==============================================  History reviewed.   Health Maintenance Due   Topic Date Due    Mammogram  Never done    Shingles Vaccine (1 of 2) Never done    Pneumococcal Vaccines (Age 65+) (1 - PCV) Never done   Patient will consider Shingrix vaccine and the Pneumovax 20.  She declines today.    Past Medical History:  Past Medical History:   Diagnosis Date    Adrenal hyperplasia, congenital      Past Surgical History:   Procedure Laterality Date    GALLBLADDER SURGERY N/A 2008    MOUTH SURGERY Right 2022     Review of patient's allergies indicates:  No Known Allergies  Current Outpatient Medications on File Prior to Visit   Medication Sig Dispense Refill    multivitamin capsule Take 1 capsule by mouth once daily.      risedronate (ACTONEL) 150 MG Tab Take one tablet monthly on an empty stomach, with 8 ounces of water. Remain upright and avoid food or drink for 45 minutes after taking this drug. 1 tablet 11     [DISCONTINUED] predniSONE (DELTASONE) 5 MG tablet Take 1 tablet (5 mg total) by mouth once daily. 90 tablet 4    [DISCONTINUED] amoxicillin (AMOXIL) 500 MG capsule Take 500 mg by mouth every 8 (eight) hours.       No current facility-administered medications on file prior to visit.     Social History     Socioeconomic History    Marital status:    Tobacco Use    Smoking status: Former    Smokeless tobacco: Never   Substance and Sexual Activity    Alcohol use: Never    Drug use: Yes     Types: Marijuana    Sexual activity: Yes     Family History   Problem Relation Age of Onset    Alcohol abuse Mother     COPD Mother     Hypertension Mother     Alcohol abuse Father     COPD Father     Diabetes Father        Review of Systems   Constitutional:  Negative for chills, fatigue, fever and unexpected weight change.   HENT:  Negative for ear pain and sore throat.    Eyes:  Negative for redness and visual disturbance.   Respiratory:  Negative for cough and shortness of breath.    Cardiovascular:  Negative for chest pain and palpitations.   Gastrointestinal:  Negative for nausea and vomiting.   Genitourinary:  Negative for difficulty urinating and hematuria.   Musculoskeletal:  Negative for arthralgias and myalgias.   Skin:  Negative for rash and wound.   Neurological:  Negative for weakness and headaches.   Psychiatric/Behavioral:  Negative for sleep disturbance. The patient is not nervous/anxious.     Objective:    Nursing note and vitals reviewed.  Vitals:    06/13/23 1019   BP: 130/76   Pulse: 79     Body mass index is 19.02 kg/m².   Physical Exam  Vitals and nursing note reviewed.   Constitutional:       General: She is not in acute distress.     Appearance: She is well-developed. She is not ill-appearing, toxic-appearing or diaphoretic.   HENT:      Head: Normocephalic and atraumatic.      Right Ear: Hearing and external ear normal.      Left Ear: Hearing and external ear normal.      Nose: Nose normal. No  rhinorrhea.   Eyes:      General: Lids are normal.      Extraocular Movements: Extraocular movements intact.      Conjunctiva/sclera: Conjunctivae normal.      Pupils: Pupils are equal, round, and reactive to light.   Cardiovascular:      Rate and Rhythm: Normal rate.      Pulses: Normal pulses.   Pulmonary:      Effort: Pulmonary effort is normal. No respiratory distress.      Breath sounds: Normal breath sounds.   Musculoskeletal:         General: Normal range of motion.      Cervical back: Normal range of motion and neck supple.   Skin:     General: Skin is warm and dry.      Capillary Refill: Capillary refill takes less than 2 seconds.      Coloration: Skin is not pale.   Neurological:      General: No focal deficit present.      Mental Status: She is alert and oriented to person, place, and time. She is not disoriented.   Psychiatric:         Attention and Perception: She is attentive.         Mood and Affect: Mood normal. Mood is not anxious or depressed.         Speech: Speech is not rapid and pressured or slurred.         Behavior: Behavior normal. Behavior is not agitated, aggressive or hyperactive. Behavior is cooperative.         Thought Content: Thought content normal. Thought content is not paranoid or delusional. Thought content does not include homicidal or suicidal ideation. Thought content does not include homicidal or suicidal plan.         Cognition and Memory: Memory is not impaired.         Judgment: Judgment normal.     Lab Visit on 06/24/2022   Component Date Value Ref Range Status    Vit D, 25-Hydroxy 06/24/2022 48  30 - 96 ng/mL Final    Comment: Vitamin D deficiency.........<10 ng/mL                              Vitamin D insufficiency......10-29 ng/mL       Vitamin D sufficiency........> or equal to 30 ng/mL  Vitamin D toxicity............>100 ng/mL      Renin Activity 06/24/2022 7.1  ng/mL/h Final    Comment: -------------------REFERENCE VALUE--------------------------  (Peripheral vein  specimen)  Na-deplete, upright:  Mean: 5.9  Range: 2.9-10.8  Na-replete, upright:  Mean: 1.0  Range: < or =0.6-3.0    -------------------ADDITIONAL INFORMATION-------------------  Testing performed by Liquid Chromatography-Tandem Mass   Spectrometry (LC-MS/MS).  This test was developed and its performance characteristics   determined by [facility] in a manner consistent with CLIA   requirements. This test has not been cleared or approved by   the U.S. Food and Drug Administration.    Test Performed by:  [facility]  [address] [place] [address]  : Jordon Pascal M.D. Ph.D.; CLIA# [id number]        Assessment / Plan:      1.  ANNUAL WELLNESS EXAM -patient here for annual wellness exam.  Labs ordered.  Health maintenance was reviewed and ordered.  Medications were reviewed and reconciled.   Anticipatory guidance: Don't smoke.  Healthy diet and regular exercise recommended. Vaccine recommendations discussed.  See orders.  Reviewed Anticipatory guidance, risk factor reduction interventions and counseling, Complete history , physical was completed today.  Complete and thorough medication reconciliation was performed.  Discussed risks and benefits of medications.  Advised patient on orders and health maintenance.  We discussed old records and old labs if available.  Will request any records not available through epic.  Continue current medications listed on your summary sheet.  CAH:  Continue steroids.  Patient following with Endocrinology.  Immunosuppression precautions.  Patient also has osteoporosis.  Continue Actonel.  Follow-up with Endocrinology.  Weight-bearing exercise recommended.  All questions were answered. Patient had no further concerns. Advised of Wellness plan. Follow up in 1 year for ANNUAL WELLNESS EXAM    Orders Placed This Encounter   Procedures    CBC Without Differential     Standing Status:   Future     Standing Expiration Date:    8/11/2024    Comprehensive Metabolic Panel     Standing Status:   Future     Standing Expiration Date:   8/11/2024    TSH     Standing Status:   Future     Standing Expiration Date:   8/11/2024    Hemoglobin A1C     Standing Status:   Future     Standing Expiration Date:   8/11/2024    Lipid Panel     Standing Status:   Future     Standing Expiration Date:   8/11/2024    Hepatitis C Antibody     Standing Status:   Future     Standing Expiration Date:   8/11/2024     Order Specific Question:   Release to patient     Answer:   Immediate     Medications Ordered This Encounter   Medications    predniSONE (DELTASONE) 5 MG tablet     Sig: Take 1 tablet (5 mg total) by mouth once daily.     Dispense:  90 tablet     Refill:  4          Yash Gonzalez MD

## 2023-06-13 NOTE — PATIENT INSTRUCTIONS
Follow up in about 1 year (around 6/13/2024), or if symptoms worsen or fail to improve, for Annual Wellness Exam.     Dear patient,   As a result of recent federal legislation (The Federal Cures Act), you may receive lab or pathology results from your visit in your [facility] account before your physician is able to contact you. Your physician or their representative will relay the results to you with their recommendations at their soonest availability.     If no improvement in symptoms or symptoms worsen, please be advised to call MD, follow-up at clinic and/or go to ER if becomes severe.    Yash Gonzalez M.D.        We Offer TELEHEALTH & Same Day Appointments!   Book your Telehealth appointment with me through my nurse or   Clinic appointments on [facility]!    [address] [place] [address]    Office: [phone]   FAX: [phone]    Check out my Facebook Page and Follow Me at: [email or web address]    Check out my website at [employer] by clicking on: [email or web address]    To Schedule appointments online, go to [facility]: [email or web address]

## 2023-06-15 ENCOUNTER — TELEPHONE (OUTPATIENT)
Dept: FAMILY MEDICINE | Facility: CLINIC | Age: 68
End: 2023-06-15
Payer: COMMERCIAL

## 2023-06-15 NOTE — PROGRESS NOTES
Make follow-up lab appointment per recommendation below.  Check to see if patient has seen the results through my chart.  If not then,  #CALL THE PATIENT# to discuss results/see if they have questions and document verification of contact. Make F/U appt if needed. [phone]    #My interpretation that was sent to them through [employer]:  Adore, I have reviewed your recent blood work.     Hepatitis-C screening test is reactive.  Further testing is needed to confirm if this is a true positive with hepatitis-C viral load test.  Please make a follow-up appointment so we can discuss this in detail.  Your complete blood count is stable.    Your metabolic panel which shows your glucose, kidney function, electrolytes, and liver function is normal.   Thyroid study is normal.   Your cholesterol is normal.    Your hemoglobin A1c is normal.  This test is gold standard screening test for diabetes.  It is a measures 3 months of your average blood sugar.  =========================  Also please address any outstanding health maintenance that may be due: Mammogram Never done  Shingles Vaccine(1 of 2) Never done  Pneumococcal Vaccines (Age 65+)(1 - PCV) Never done

## 2023-07-11 ENCOUNTER — OFFICE VISIT (OUTPATIENT)
Dept: FAMILY MEDICINE | Facility: CLINIC | Age: 68
End: 2023-07-11
Payer: COMMERCIAL

## 2023-07-11 ENCOUNTER — LAB VISIT (OUTPATIENT)
Dept: LAB | Facility: HOSPITAL | Age: 68
End: 2023-07-11
Attending: FAMILY MEDICINE
Payer: COMMERCIAL

## 2023-07-11 VITALS
OXYGEN SATURATION: 99 % | WEIGHT: 109.31 LBS | SYSTOLIC BLOOD PRESSURE: 117 MMHG | HEART RATE: 82 BPM | DIASTOLIC BLOOD PRESSURE: 72 MMHG | BODY MASS INDEX: 20.11 KG/M2 | HEIGHT: 62 IN | RESPIRATION RATE: 18 BRPM

## 2023-07-11 DIAGNOSIS — R76.8 HEPATITIS C ANTIBODY TEST POSITIVE: ICD-10-CM

## 2023-07-11 DIAGNOSIS — R76.8 HEPATITIS C ANTIBODY TEST POSITIVE: Primary | ICD-10-CM

## 2023-07-11 DIAGNOSIS — Z79.899 ENCOUNTER FOR LONG-TERM (CURRENT) USE OF MEDICATIONS: ICD-10-CM

## 2023-07-11 LAB
INR PPP: 0.9 (ref 0.8–1.2)
PROTHROMBIN TIME: 9.8 SEC (ref 9–12.5)

## 2023-07-11 PROCEDURE — 87340 HEPATITIS B SURFACE AG IA: CPT | Performed by: FAMILY MEDICINE

## 2023-07-11 PROCEDURE — 1101F PR PT FALLS ASSESS DOC 0-1 FALLS W/OUT INJ PAST YR: ICD-10-PCS | Mod: CPTII,S$GLB,, | Performed by: FAMILY MEDICINE

## 2023-07-11 PROCEDURE — 86790 VIRUS ANTIBODY NOS: CPT | Performed by: FAMILY MEDICINE

## 2023-07-11 PROCEDURE — 1101F PT FALLS ASSESS-DOCD LE1/YR: CPT | Mod: CPTII,S$GLB,, | Performed by: FAMILY MEDICINE

## 2023-07-11 PROCEDURE — 86706 HEP B SURFACE ANTIBODY: CPT | Mod: 91 | Performed by: FAMILY MEDICINE

## 2023-07-11 PROCEDURE — 3288F FALL RISK ASSESSMENT DOCD: CPT | Mod: CPTII,S$GLB,, | Performed by: FAMILY MEDICINE

## 2023-07-11 PROCEDURE — 36415 COLL VENOUS BLD VENIPUNCTURE: CPT | Mod: PO | Performed by: FAMILY MEDICINE

## 2023-07-11 PROCEDURE — 3078F PR MOST RECENT DIASTOLIC BLOOD PRESSURE < 80 MM HG: ICD-10-PCS | Mod: CPTII,S$GLB,, | Performed by: FAMILY MEDICINE

## 2023-07-11 PROCEDURE — 3288F PR FALLS RISK ASSESSMENT DOCUMENTED: ICD-10-PCS | Mod: CPTII,S$GLB,, | Performed by: FAMILY MEDICINE

## 2023-07-11 PROCEDURE — 1160F RVW MEDS BY RX/DR IN RCRD: CPT | Mod: CPTII,S$GLB,, | Performed by: FAMILY MEDICINE

## 2023-07-11 PROCEDURE — 87902 NFCT AGT GNTYP ALYS HEP C: CPT | Performed by: FAMILY MEDICINE

## 2023-07-11 PROCEDURE — 1159F PR MEDICATION LIST DOCUMENTED IN MEDICAL RECORD: ICD-10-PCS | Mod: CPTII,S$GLB,, | Performed by: FAMILY MEDICINE

## 2023-07-11 PROCEDURE — 99999 PR PBB SHADOW E&M-EST. PATIENT-LVL IV: ICD-10-PCS | Mod: PBBFAC,,, | Performed by: FAMILY MEDICINE

## 2023-07-11 PROCEDURE — 1126F AMNT PAIN NOTED NONE PRSNT: CPT | Mod: CPTII,S$GLB,, | Performed by: FAMILY MEDICINE

## 2023-07-11 PROCEDURE — 3078F DIAST BP <80 MM HG: CPT | Mod: CPTII,S$GLB,, | Performed by: FAMILY MEDICINE

## 2023-07-11 PROCEDURE — 1126F PR PAIN SEVERITY QUANTIFIED, NO PAIN PRESENT: ICD-10-PCS | Mod: CPTII,S$GLB,, | Performed by: FAMILY MEDICINE

## 2023-07-11 PROCEDURE — 99214 OFFICE O/P EST MOD 30 MIN: CPT | Mod: S$GLB,,, | Performed by: FAMILY MEDICINE

## 2023-07-11 PROCEDURE — 3074F SYST BP LT 130 MM HG: CPT | Mod: CPTII,S$GLB,, | Performed by: FAMILY MEDICINE

## 2023-07-11 PROCEDURE — 87522 HEPATITIS C REVRS TRNSCRPJ: CPT | Performed by: FAMILY MEDICINE

## 2023-07-11 PROCEDURE — 1159F MED LIST DOCD IN RCRD: CPT | Mod: CPTII,S$GLB,, | Performed by: FAMILY MEDICINE

## 2023-07-11 PROCEDURE — 1160F PR REVIEW ALL MEDS BY PRESCRIBER/CLIN PHARMACIST DOCUMENTED: ICD-10-PCS | Mod: CPTII,S$GLB,, | Performed by: FAMILY MEDICINE

## 2023-07-11 PROCEDURE — 82105 ALPHA-FETOPROTEIN SERUM: CPT | Performed by: FAMILY MEDICINE

## 2023-07-11 PROCEDURE — 3008F BODY MASS INDEX DOCD: CPT | Mod: CPTII,S$GLB,, | Performed by: FAMILY MEDICINE

## 2023-07-11 PROCEDURE — 3008F PR BODY MASS INDEX (BMI) DOCUMENTED: ICD-10-PCS | Mod: CPTII,S$GLB,, | Performed by: FAMILY MEDICINE

## 2023-07-11 PROCEDURE — 3044F HG A1C LEVEL LT 7.0%: CPT | Mod: CPTII,S$GLB,, | Performed by: FAMILY MEDICINE

## 2023-07-11 PROCEDURE — 99214 PR OFFICE/OUTPT VISIT, EST, LEVL IV, 30-39 MIN: ICD-10-PCS | Mod: S$GLB,,, | Performed by: FAMILY MEDICINE

## 2023-07-11 PROCEDURE — 3044F PR MOST RECENT HEMOGLOBIN A1C LEVEL <7.0%: ICD-10-PCS | Mod: CPTII,S$GLB,, | Performed by: FAMILY MEDICINE

## 2023-07-11 PROCEDURE — 85610 PROTHROMBIN TIME: CPT | Performed by: FAMILY MEDICINE

## 2023-07-11 PROCEDURE — 86709 HEPATITIS A IGM ANTIBODY: CPT | Performed by: FAMILY MEDICINE

## 2023-07-11 PROCEDURE — 81596 NFCT DS CHRNC HCV 6 ASSAYS: CPT | Performed by: FAMILY MEDICINE

## 2023-07-11 PROCEDURE — 99999 PR PBB SHADOW E&M-EST. PATIENT-LVL IV: CPT | Mod: PBBFAC,,, | Performed by: FAMILY MEDICINE

## 2023-07-11 PROCEDURE — 3074F PR MOST RECENT SYSTOLIC BLOOD PRESSURE < 130 MM HG: ICD-10-PCS | Mod: CPTII,S$GLB,, | Performed by: FAMILY MEDICINE

## 2023-07-11 PROCEDURE — 87389 HIV-1 AG W/HIV-1&-2 AB AG IA: CPT | Performed by: FAMILY MEDICINE

## 2023-07-11 NOTE — PATIENT INSTRUCTIONS
Follow up in about 4 weeks (around 8/8/2023), or if symptoms worsen or fail to improve, for lab result.     Dear patient,   As a result of recent federal legislation (The Federal Cures Act), you may receive lab or pathology results from your visit in your [facility] account before your physician is able to contact you. Your physician or their representative will relay the results to you with their recommendations at their soonest availability.     If no improvement in symptoms or symptoms worsen, please be advised to call MD, follow-up at clinic and/or go to ER if becomes severe.    Yash Gonzalez M.D.        We Offer TELEHEALTH & Same Day Appointments!   Book your Telehealth appointment with me through my nurse or   Clinic appointments on [employer]!    [address] [place] [address]    Office: [phone]   FAX: [phone]    Check out my Facebook Page and Follow Me at: [email or web address]    Check out my website at [employer] by clicking on: [email or web address]    To Schedule appointments online, go to [email or web address]: [email or web address]

## 2023-07-12 LAB
AFP SERPL-MCNC: 7.7 NG/ML (ref 0–8.4)
HAV IGG SER QL IA: NORMAL
HAV IGM SERPL QL IA: NORMAL
HBV SURFACE AB SER-ACNC: <3 MIU/ML
HBV SURFACE AB SER-ACNC: NORMAL M[IU]/ML
HBV SURFACE AG SERPL QL IA: NORMAL
HIV 1+2 AB+HIV1 P24 AG SERPL QL IA: NORMAL

## 2023-07-12 NOTE — ASSESSMENT & PLAN NOTE
Discussed positive hepatitis-C antibody.  Discussed further testing.  Patient agrees to be further evaluated with viral load and additional testing in preparation for treatment if viral load is positive.  Patient will also need ultrasound of the liver if positive.

## 2023-07-12 NOTE — PROGRESS NOTES
PLAN:      Problem List Items Addressed This Visit       Encounter for long-term (current) use of medications (Chronic)     Complete history and physical was completed today.  Complete and thorough medication reconciliation was performed.  Discussed risks and benefits of medications.  Advised patient on orders and health maintenance.  We discussed old records and old labs if available.  Will request any records not available through epic.  Continue current medications listed on your summary sheet.           Hepatitis C antibody test positive - Primary     Discussed positive hepatitis-C antibody.  Discussed further testing.  Patient agrees to be further evaluated with viral load and additional testing in preparation for treatment if viral load is positive.  Patient will also need ultrasound of the liver if positive.         Relevant Orders    Hepatitis C RNA, Quantitative, PCR    HCV Fibrosure    Protime-INR (Completed)    HIV 1/2 Ag/Ab (4th Gen)    AFP Tumor Marker    Hepatitis B Surface Antigen    Hepatitis B Surface Ab, Qualitative    Hepatitis A antibody, IgG    Hepatitis A Antibody, IgM    Hepatitis C Genotype        Medication Management for assessment above:   Medication List with Changes/Refills   Current Medications    MULTIVITAMIN CAPSULE    Take 1 capsule by mouth once daily.    PREDNISONE (DELTASONE) 5 MG TABLET    Take 1 tablet (5 mg total) by mouth once daily.    RISEDRONATE (ACTONEL) 150 MG TAB    Take one tablet monthly on an empty stomach, with 8 ounces of water. Remain upright and avoid food or drink for 45 minutes after taking this drug.       Yash Gonzalez M.D.  ==========================================================================  Subjective:   Patient ID: Radha Jha is a 67 y.o. female.  has a past medical history of Adrenal hyperplasia, congenital.   Chief Complaint: Follow-up      Problem List Items Addressed This Visit       Encounter for long-term (current) use of  medications (Chronic)    Overview     July 2023:  Reviewed labs.  CHRONIC. Stable. Compliant with medications for managed conditions. See medication list. No SE reported.   Routine lab analysis is being monitored. Refills were addressed.  Lab Results   Component Value Date    WBC 8.97 06/13/2023    HGB 14.5 06/13/2023    HCT 42.6 06/13/2023    MCV 95 06/13/2023     06/13/2023       Chemistry        Component Value Date/Time     06/13/2023 1048    K 3.6 06/13/2023 1048     06/13/2023 1048    CO2 24 06/13/2023 1048    BUN 12 06/13/2023 1048    CREATININE 0.9 06/13/2023 1048    GLU 97 06/13/2023 1048        Component Value Date/Time    CALCIUM 9.4 06/13/2023 1048    ALKPHOS 64 06/13/2023 1048    AST 30 06/13/2023 1048    ALT 32 06/13/2023 1048    BILITOT 0.3 06/13/2023 1048    ESTGFRAFRICA >60.0 06/14/2022 0804    EGFRNONAA >60.0 06/14/2022 0804          Lab Results   Component Value Date    TSH 1.843 06/13/2023            Current Assessment & Plan     Complete history and physical was completed today.  Complete and thorough medication reconciliation was performed.  Discussed risks and benefits of medications.  Advised patient on orders and health maintenance.  We discussed old records and old labs if available.  Will request any records not available through epic.  Continue current medications listed on your summary sheet.           Hepatitis C antibody test positive - Primary    Overview     July 2022:  Patient had hepatitis-C screening which returned positive.  Patient reports no known exposure.  She reports she did have a dental procedure performed while she was living in [facility] when she was a teenager.    Previous HPI:  Patient is due for hepatitis C screening.   Patient advised of risk of vi hepatitis C including being Born between 1946 and 1966, blood transfusions prior to 1992, IV or nasal drug use, tattoos, sexual intercourse.  Patient denies being tested.  Patient denies known  "history of hepatitis-C.    Lab Results   Component Value Date    HEPCAB Reactive (A) 06/13/2023            Current Assessment & Plan     Discussed positive hepatitis-C antibody.  Discussed further testing.  Patient agrees to be further evaluated with viral load and additional testing in preparation for treatment if viral load is positive.  Patient will also need ultrasound of the liver if positive.             Review of patient's allergies indicates:  No Known Allergies  Current Outpatient Medications   Medication Instructions    multivitamin capsule 1 capsule, Oral, Daily    predniSONE (DELTASONE) 5 mg, Oral, Daily    risedronate (ACTONEL) 150 MG Tab Take one tablet monthly on an empty stomach, with 8 ounces of water. Remain upright and avoid food or drink for 45 minutes after taking this drug.      I have reviewed the PMH, social history, FamilyHx, surgical history, allergies and medications documented / confirmed by the patient at the time of this visit.  Review of Systems   Constitutional:  Negative for chills, fatigue, fever and unexpected weight change.   HENT:  Negative for ear pain and sore throat.    Eyes:  Negative for redness and visual disturbance.   Respiratory:  Negative for cough and shortness of breath.    Cardiovascular:  Negative for chest pain and palpitations.   Gastrointestinal:  Negative for nausea and vomiting.   Genitourinary:  Negative for difficulty urinating and hematuria.   Musculoskeletal:  Negative for arthralgias and myalgias.   Skin:  Negative for rash and wound.   Neurological:  Negative for weakness and headaches.   Psychiatric/Behavioral:  Negative for sleep disturbance. The patient is not nervous/anxious.    Objective:   /72 (BP Location: Right arm)   Pulse 82   Resp 18   Ht 5' 2" (1.575 m)   Wt 49.6 kg (109 lb 4.8 oz)   SpO2 99%   BMI 19.99 kg/m²   Physical Exam  Vitals and nursing note reviewed.   Constitutional:       General: She is not in acute distress.     " Appearance: She is well-developed. She is not ill-appearing, toxic-appearing or diaphoretic.   HENT:      Head: Normocephalic and atraumatic.      Right Ear: Hearing and external ear normal.      Left Ear: Hearing and external ear normal.      Nose: Nose normal. No rhinorrhea.   Eyes:      General: Lids are normal.      Extraocular Movements: Extraocular movements intact.      Conjunctiva/sclera: Conjunctivae normal.      Pupils: Pupils are equal, round, and reactive to light.   Cardiovascular:      Rate and Rhythm: Normal rate.      Pulses: Normal pulses.   Pulmonary:      Effort: Pulmonary effort is normal. No respiratory distress.      Breath sounds: Normal breath sounds.   Abdominal:      General: Bowel sounds are normal.      Palpations: Abdomen is soft.   Musculoskeletal:         General: Normal range of motion.      Cervical back: Normal range of motion and neck supple.   Skin:     General: Skin is warm and dry.      Capillary Refill: Capillary refill takes less than 2 seconds.      Coloration: Skin is not pale.   Neurological:      General: No focal deficit present.      Mental Status: She is alert and oriented to person, place, and time. She is not disoriented.      Cranial Nerves: No cranial nerve deficit.      Motor: No weakness.      Gait: Gait normal.   Psychiatric:         Attention and Perception: She is attentive.         Mood and Affect: Mood normal. Mood is not anxious or depressed.         Speech: Speech is not rapid and pressured or slurred.         Behavior: Behavior normal. Behavior is not agitated, aggressive or hyperactive. Behavior is cooperative.         Thought Content: Thought content normal. Thought content is not paranoid or delusional. Thought content does not include homicidal or suicidal ideation. Thought content does not include homicidal or suicidal plan.         Cognition and Memory: Memory is not impaired.         Judgment: Judgment normal.       Assessment:     1. Hepatitis C  antibody test positive    2. Encounter for long-term (current) use of medications      MDM:   Moderate medical complexity.  Moderate risk.  Total time: 31 minutes.  This includes total time spent on the encounter, which includes face to face time and non-face to face time preparing to see the patient (eg, review of previous medical records, tests), Obtaining and/or reviewing separately obtained history, documenting clinical information in the electronic or other health record, independently interpreting results (not separately reported)/communicating results to the patient/family/caregiver, and/or care coordination (not separately reported).    I have Reviewed and summarized old records.  I have performed thorough medication reconciliation today and discussed risk and benefits of medications.  I have reviewed labs and discussed with patient.  All questions were answered.    I have signed for the following orders AND/OR meds.  Orders Placed This Encounter   Procedures    Hepatitis C RNA, Quantitative, PCR     Standing Status:   Future     Number of Occurrences:   1     Standing Expiration Date:   9/8/2024    HCV Fibrosure     Sample has a 5 day stability, consideration should be given when ordering and collecting prior to a holiday weekend.     Standing Status:   Future     Number of Occurrences:   1     Standing Expiration Date:   9/8/2024    Protime-INR     Standing Status:   Future     Number of Occurrences:   1     Standing Expiration Date:   9/8/2024    HIV 1/2 Ag/Ab (4th Gen)     Standing Status:   Future     Number of Occurrences:   1     Standing Expiration Date:   9/8/2024     Order Specific Question:   Release to patient     Answer:   Immediate    AFP Tumor Marker     Standing Status:   Future     Number of Occurrences:   1     Standing Expiration Date:   9/8/2024     Order Specific Question:   Release to patient     Answer:   Immediate    Hepatitis B Surface Antigen     Standing Status:   Future     Number  of Occurrences:   1     Standing Expiration Date:   7/11/2024    Hepatitis B Surface Ab, Qualitative     Standing Status:   Future     Number of Occurrences:   1     Standing Expiration Date:   7/10/2024    Hepatitis A antibody, IgG     Standing Status:   Future     Number of Occurrences:   1     Standing Expiration Date:   9/8/2024    Hepatitis A Antibody, IgM     Standing Status:   Future     Number of Occurrences:   1     Standing Expiration Date:   9/8/2024    Hepatitis C Genotype     Standing Status:   Future     Number of Occurrences:   1     Standing Expiration Date:   9/8/2024           Follow up in about 4 weeks (around 8/8/2023), or if symptoms worsen or fail to improve, for lab result.    If no improvement in symptoms or symptoms worsen, advised to call/follow-up at clinic or go to ER. Patient voiced understanding and all questions/concerns were addressed.   DISCLAIMER: This note was compiled by using a speech recognition dictation system and therefore please be aware that typographical / speech recognition errors can and do occur.  Please contact me if you see any errors specifically.    Yash Gonzalez M.D.       Office: [phone]   [address] [place] [address]  FAX: [phone]

## 2023-07-13 LAB
A2 MACROGLOB SERPL-MCNC: 364 MG/DL (ref 100–280)
ALT SERPL W P-5'-P-CCNC: 24 U/L (ref 7–45)
APO A-I SERPL-MCNC: 178 MG/DL
BILIRUB SERPL-MCNC: <0.2 MG/DL
BIOPREDICTIVE SERIAL NUMBER: ABNORMAL
FIBROSIS STAGE SERPL QL: ABNORMAL
FIBROTEST INTERPRETATION: ABNORMAL
FIBROTEST-ACTITEST COMMENT: ABNORMAL
GGT SERPL-CCNC: 46 U/L (ref 5–36)
HAPTOGLOB SERPL-MCNC: 102 MG/DL (ref 30–200)
HCV RNA SERPL NAA+PROBE-LOG IU: 6.82 LOGIU/ML
HCV RNA SERPL QL NAA+PROBE: DETECTED
HCV RNA SPEC NAA+PROBE-ACNC: ABNORMAL IU/ML
LIVER FIBR SCORE SERPL CALC.FIBROSURE: 0.35
NECROINFLAMMAT INTERP: ABNORMAL
NECROINFLAMMATORY ACT GRADE SERPL QL: ABNORMAL
NECROINFLAMMATORY ACT SCORE SERPL: 0.11

## 2023-07-16 DIAGNOSIS — B18.2 CHRONIC HEPATITIS C WITHOUT HEPATIC COMA: Primary | ICD-10-CM

## 2023-07-16 RX ORDER — VELPATASVIR AND SOFOSBUVIR 100; 400 MG/1; MG/1
1 TABLET, FILM COATED ORAL DAILY
Qty: 28 TABLET | Refills: 2 | Status: ACTIVE | OUTPATIENT
Start: 2023-07-16 | End: 2023-07-24 | Stop reason: SDUPTHER

## 2023-07-16 NOTE — PROGRESS NOTES
Make follow-up lab appointment per recommendation below.  Check to see if patient has seen the results through my chart.  If not then,  #CALL THE PATIENT# to discuss results/see if they have questions and document verification of contact. Make F/U appt if needed. [phone]    #My interpretation that was sent to them through [facility]:  Adore, I have reviewed your recent blood work.     The hepatitis-C confirmatory test has confirm that there is active hepatitis-C virus present in the body.  We need to treat this with an occlusive which is a three-month treatment to eradicate the hepatitis-C.  I have sent this to the specialty pharmacy to see if we can get this covered for you.  In addition also need to get an ultrasound of your liver which has been ordered.  Please set this up at your convenience.  Follow-up with me sooner if having any questions or concerns about these test.    Hepatitis-B test shows that you are not immune to HBV infection.  I recommend updating hepatitis-B vaccination prior to starting the Epclusa.  You can get this from us or from the pharmacy/ Health unit.    Hepatitis a is nonreactive.  AFP tumor marker is normal.  HIV test is negative.  PT INR is normal.  =========================  Also please address any outstanding health maintenance that may be due: Mammogram Never done  Shingles Vaccine(1 of 2) Never done  Pneumococcal Vaccines (Age 65+)(1 - PCV) Never done

## 2023-07-18 ENCOUNTER — TELEPHONE (OUTPATIENT)
Dept: PHARMACY | Facility: CLINIC | Age: 68
End: 2023-07-18
Payer: COMMERCIAL

## 2023-07-18 ENCOUNTER — TELEPHONE (OUTPATIENT)
Dept: FAMILY MEDICINE | Facility: CLINIC | Age: 68
End: 2023-07-18
Payer: COMMERCIAL

## 2023-07-18 DIAGNOSIS — R76.8 HEPATITIS C ANTIBODY TEST POSITIVE: Primary | ICD-10-CM

## 2023-07-18 DIAGNOSIS — B18.2 CHRONIC HEPATITIS C WITHOUT HEPATIC COMA: ICD-10-CM

## 2023-07-18 NOTE — TELEPHONE ENCOUNTER
Hello, this is Nicolette Huber, clinical pharmacist with [facility] that is part of your care team.  We have begun working on your prescription that your doctor has sent us. Our next steps include:     Working with your insurance company to obtain approval for your medication  Working with you to ensure your medication is affordable     We will be calling you along the way with updates on your medication but if you have any concerns or receive information that you would like to discuss please reach us at [phone].    Welcome call outcome: Left voicemail.    Epclusa PA denied as it must be prescribed by a gastroenterologist, hepatologist, infectious disease physician, or liver transplant physician. Provider open to referral to hepatologist. Outgoing call to discuss with pt, LVM.

## 2023-07-18 NOTE — TELEPHONE ENCOUNTER
----- Message from Nicolette Huber PharmD sent at 7/18/2023  2:52 PM CDT -----  Regarding: Epclusa Approval  Hi Dr Gonzalez and Staff,      We received the rx for Epclsua, however this patient's insurance has strict requirements that this medication will only be covered if it is directly prescribed by a gastroenterologist, hepatologist, infectious disease physician, or liver transplant physician. Let me know how you would like to proceed.      Thank you,  Nicolette Huber, PharmD  [facility]  Ph: [phone]

## 2023-07-19 ENCOUNTER — TELEPHONE (OUTPATIENT)
Dept: FAMILY MEDICINE | Facility: CLINIC | Age: 68
End: 2023-07-19
Payer: COMMERCIAL

## 2023-07-19 ENCOUNTER — PATIENT MESSAGE (OUTPATIENT)
Dept: FAMILY MEDICINE | Facility: CLINIC | Age: 68
End: 2023-07-19
Payer: COMMERCIAL

## 2023-07-19 DIAGNOSIS — Z23 IMMUNIZATION DUE: ICD-10-CM

## 2023-07-19 DIAGNOSIS — Z23 NEED FOR VACCINATION: Primary | ICD-10-CM

## 2023-07-19 NOTE — TELEPHONE ENCOUNTER
I have signed for the following orders AND/OR meds.  Please call the patient and ask the patient to schedule the testing AND/OR inform about any medications that were sent.      Orders Placed This Encounter   Procedures    (In Office Administered) Hepatitis B Vaccine (Adult) (IM)     Standing Status:   Future     Standing Expiration Date:   1/19/2024

## 2023-07-19 NOTE — TELEPHONE ENCOUNTER
----- Message from Lori Thapa sent at 7/19/2023  9:54 AM CDT -----  Contact: Radha Bishop is calling to speak with the nurse to schedule a nurse visit for a hep b vaccine. Please give patient a callback at [phone].

## 2023-07-19 NOTE — TELEPHONE ENCOUNTER
Called pt scheduled nurse visit for hep b vaccine, please place order, I do not know which order to pend.

## 2023-07-20 ENCOUNTER — TELEPHONE (OUTPATIENT)
Dept: FAMILY MEDICINE | Facility: CLINIC | Age: 68
End: 2023-07-20

## 2023-07-20 ENCOUNTER — PATIENT MESSAGE (OUTPATIENT)
Dept: FAMILY MEDICINE | Facility: CLINIC | Age: 68
End: 2023-07-20

## 2023-07-20 ENCOUNTER — CLINICAL SUPPORT (OUTPATIENT)
Dept: FAMILY MEDICINE | Facility: CLINIC | Age: 68
End: 2023-07-20
Payer: COMMERCIAL

## 2023-07-20 DIAGNOSIS — Z23 IMMUNIZATION DUE: Primary | ICD-10-CM

## 2023-07-20 DIAGNOSIS — Z23 NEED FOR VACCINATION: ICD-10-CM

## 2023-07-20 LAB
HCV GENTYP SERPL NAA+PROBE: ABNORMAL
HCV RNA SERPL NAA+PROBE-LOG IU: 6.6 LOG (10) IU/ML
HCV RNA SERPL QL NAA+PROBE: DETECTED
HCV RNA SPEC NAA+PROBE-ACNC: ABNORMAL IU/ML

## 2023-07-20 PROCEDURE — 99999 PR PBB SHADOW E&M-EST. PATIENT-LVL II: ICD-10-PCS | Mod: PBBFAC,,,

## 2023-07-20 PROCEDURE — 90471 HEPATITIS B (RECOMBINANT) ADJUVANTED, 2 DOSE: ICD-10-PCS | Mod: S$GLB,,, | Performed by: STUDENT IN AN ORGANIZED HEALTH CARE EDUCATION/TRAINING PROGRAM

## 2023-07-20 PROCEDURE — 90739 HEPATITIS B (RECOMBINANT) ADJUVANTED, 2 DOSE: ICD-10-PCS | Mod: S$GLB,,, | Performed by: STUDENT IN AN ORGANIZED HEALTH CARE EDUCATION/TRAINING PROGRAM

## 2023-07-20 PROCEDURE — 90739 HEPB VACC 2/4 DOSE ADULT IM: CPT | Mod: S$GLB,,, | Performed by: STUDENT IN AN ORGANIZED HEALTH CARE EDUCATION/TRAINING PROGRAM

## 2023-07-20 PROCEDURE — 90471 IMMUNIZATION ADMIN: CPT | Mod: S$GLB,,, | Performed by: STUDENT IN AN ORGANIZED HEALTH CARE EDUCATION/TRAINING PROGRAM

## 2023-07-20 PROCEDURE — 99999 PR PBB SHADOW E&M-EST. PATIENT-LVL II: CPT | Mod: PBBFAC,,,

## 2023-07-20 NOTE — TELEPHONE ENCOUNTER
Pt scheduled for second dose of Hep B vaccine in one month. Please sign pend dose and I will link to scheduled appointment. Thank you.

## 2023-07-20 NOTE — PROGRESS NOTES
I have sent a msg to patient with the following interpretation (see below):    + HCV. Please be sure to keep hepatology appt for 7/24    Please do not hesitate to call or message with any questions or concerns    Aspen Juárez MD

## 2023-07-20 NOTE — TELEPHONE ENCOUNTER
Called pt no answer left vm. I will send [employer] message as well. I also have been trying to contact [facility] radiology to get her scheduled for the elastography and I have not gotten a response back. I have 2 vm at the radiology department.

## 2023-07-20 NOTE — TELEPHONE ENCOUNTER
I have signed for the following orders AND/OR meds.  Please call the patient and ask the patient to schedule the testing AND/OR inform about any medications that were sent.      Orders Placed This Encounter   Procedures    (In Office Administered) Hepatitis B (Recombinant) Adjuvanted, 2 dose    Hepatitis B (Recombinant) Adjuvanted, 2 dose     Standing Status:   Future     Standing Expiration Date:   7/20/2024

## 2023-07-20 NOTE — TELEPHONE ENCOUNTER
I have signed for the following orders AND/OR meds.  Please call the patient and ask the patient to schedule the testing AND/OR inform about any medications that were sent.      Orders Placed This Encounter   Procedures    Ambulatory referral/consult to Hepatology     Standing Status:   Future     Standing Expiration Date:   8/20/2024     Referral Priority:   Routine     Referral Type:   Consultation     Referral Reason:   Specialty Services Required     Requested Specialty:   Hepatology     Number of Visits Requested:   1

## 2023-07-20 NOTE — TELEPHONE ENCOUNTER
Orders Placed This Encounter   Procedures    (In Office Administered) Hepatitis B (Recombinant) Adjuvanted, 2 dose

## 2023-07-20 NOTE — TELEPHONE ENCOUNTER
Please find out the status of Epclusa. Is the patient wanting to see GI or hepatology since the insurance will not cover the medication prescribed by me?

## 2023-07-20 NOTE — TELEPHONE ENCOUNTER
----- Message from Nga Serrano sent at 7/20/2023 11:30 AM CDT -----  Contact: pat Garcia  .Type:  Patient Returning Call    Who Called: pat Garcia   Who Left Message for Patient: Julieth  Does the patient know what this is regarding?: No  Would the patient rather a call back or a response via [employer]?  Call  Best Call Back Number: .[phone]   Additional Information:

## 2023-07-20 NOTE — TELEPHONE ENCOUNTER
Second outgoing call to discuss referral options with ptROXANA.    Her provider is open to her either going to her own GI doctor or he will put in a referral to a hepatologist for her to begin Epclusa.

## 2023-07-20 NOTE — TELEPHONE ENCOUNTER
Incoming call from pt returning call, pt would like a referral to a hepatologist. She will be in contact with provider, routing to provider for awareness. Closing out of OSP at this time.

## 2023-07-22 ENCOUNTER — PATIENT MESSAGE (OUTPATIENT)
Dept: FAMILY MEDICINE | Facility: CLINIC | Age: 68
End: 2023-07-22
Payer: COMMERCIAL

## 2023-07-24 ENCOUNTER — OFFICE VISIT (OUTPATIENT)
Dept: HEPATOLOGY | Facility: CLINIC | Age: 68
End: 2023-07-24
Payer: COMMERCIAL

## 2023-07-24 ENCOUNTER — LAB VISIT (OUTPATIENT)
Dept: LAB | Facility: HOSPITAL | Age: 68
End: 2023-07-24
Attending: NURSE PRACTITIONER
Payer: COMMERCIAL

## 2023-07-24 VITALS
HEIGHT: 62 IN | DIASTOLIC BLOOD PRESSURE: 68 MMHG | SYSTOLIC BLOOD PRESSURE: 110 MMHG | HEART RATE: 75 BPM | BODY MASS INDEX: 19.44 KG/M2 | WEIGHT: 105.63 LBS

## 2023-07-24 DIAGNOSIS — R76.8 HEPATITIS C ANTIBODY TEST POSITIVE: ICD-10-CM

## 2023-07-24 DIAGNOSIS — B18.2 CHRONIC HEPATITIS C WITHOUT HEPATIC COMA: ICD-10-CM

## 2023-07-24 LAB — HBV CORE AB SERPL QL IA: NORMAL

## 2023-07-24 PROCEDURE — 99204 OFFICE O/P NEW MOD 45 MIN: CPT | Mod: S$GLB,,, | Performed by: NURSE PRACTITIONER

## 2023-07-24 PROCEDURE — 86704 HEP B CORE ANTIBODY TOTAL: CPT | Performed by: NURSE PRACTITIONER

## 2023-07-24 PROCEDURE — 3078F DIAST BP <80 MM HG: CPT | Mod: CPTII,S$GLB,, | Performed by: NURSE PRACTITIONER

## 2023-07-24 PROCEDURE — 3044F HG A1C LEVEL LT 7.0%: CPT | Mod: CPTII,S$GLB,, | Performed by: NURSE PRACTITIONER

## 2023-07-24 PROCEDURE — 36415 COLL VENOUS BLD VENIPUNCTURE: CPT | Performed by: NURSE PRACTITIONER

## 2023-07-24 PROCEDURE — 3078F PR MOST RECENT DIASTOLIC BLOOD PRESSURE < 80 MM HG: ICD-10-PCS | Mod: CPTII,S$GLB,, | Performed by: NURSE PRACTITIONER

## 2023-07-24 PROCEDURE — 3008F PR BODY MASS INDEX (BMI) DOCUMENTED: ICD-10-PCS | Mod: CPTII,S$GLB,, | Performed by: NURSE PRACTITIONER

## 2023-07-24 PROCEDURE — 1159F MED LIST DOCD IN RCRD: CPT | Mod: CPTII,S$GLB,, | Performed by: NURSE PRACTITIONER

## 2023-07-24 PROCEDURE — 1101F PT FALLS ASSESS-DOCD LE1/YR: CPT | Mod: CPTII,S$GLB,, | Performed by: NURSE PRACTITIONER

## 2023-07-24 PROCEDURE — 3288F FALL RISK ASSESSMENT DOCD: CPT | Mod: CPTII,S$GLB,, | Performed by: NURSE PRACTITIONER

## 2023-07-24 PROCEDURE — 3074F PR MOST RECENT SYSTOLIC BLOOD PRESSURE < 130 MM HG: ICD-10-PCS | Mod: CPTII,S$GLB,, | Performed by: NURSE PRACTITIONER

## 2023-07-24 PROCEDURE — 3074F SYST BP LT 130 MM HG: CPT | Mod: CPTII,S$GLB,, | Performed by: NURSE PRACTITIONER

## 2023-07-24 PROCEDURE — 99999 PR PBB SHADOW E&M-EST. PATIENT-LVL IV: CPT | Mod: PBBFAC,,, | Performed by: NURSE PRACTITIONER

## 2023-07-24 PROCEDURE — 99204 PR OFFICE/OUTPT VISIT, NEW, LEVL IV, 45-59 MIN: ICD-10-PCS | Mod: S$GLB,,, | Performed by: NURSE PRACTITIONER

## 2023-07-24 PROCEDURE — 99999 PR PBB SHADOW E&M-EST. PATIENT-LVL IV: ICD-10-PCS | Mod: PBBFAC,,, | Performed by: NURSE PRACTITIONER

## 2023-07-24 PROCEDURE — 1125F AMNT PAIN NOTED PAIN PRSNT: CPT | Mod: CPTII,S$GLB,, | Performed by: NURSE PRACTITIONER

## 2023-07-24 PROCEDURE — 3288F PR FALLS RISK ASSESSMENT DOCUMENTED: ICD-10-PCS | Mod: CPTII,S$GLB,, | Performed by: NURSE PRACTITIONER

## 2023-07-24 PROCEDURE — 1125F PR PAIN SEVERITY QUANTIFIED, PAIN PRESENT: ICD-10-PCS | Mod: CPTII,S$GLB,, | Performed by: NURSE PRACTITIONER

## 2023-07-24 PROCEDURE — 1159F PR MEDICATION LIST DOCUMENTED IN MEDICAL RECORD: ICD-10-PCS | Mod: CPTII,S$GLB,, | Performed by: NURSE PRACTITIONER

## 2023-07-24 PROCEDURE — 3044F PR MOST RECENT HEMOGLOBIN A1C LEVEL <7.0%: ICD-10-PCS | Mod: CPTII,S$GLB,, | Performed by: NURSE PRACTITIONER

## 2023-07-24 PROCEDURE — 3008F BODY MASS INDEX DOCD: CPT | Mod: CPTII,S$GLB,, | Performed by: NURSE PRACTITIONER

## 2023-07-24 PROCEDURE — 1101F PR PT FALLS ASSESS DOC 0-1 FALLS W/OUT INJ PAST YR: ICD-10-PCS | Mod: CPTII,S$GLB,, | Performed by: NURSE PRACTITIONER

## 2023-07-24 RX ORDER — VELPATASVIR AND SOFOSBUVIR 100; 400 MG/1; MG/1
1 TABLET, FILM COATED ORAL DAILY
Qty: 28 TABLET | Refills: 2 | Status: ACTIVE | OUTPATIENT
Start: 2023-07-24 | End: 2023-08-03

## 2023-07-24 NOTE — PROGRESS NOTES
Clinic Consult:  [facility] Consultation Note    Reason for Consult:  Diagnoses of Hepatitis C antibody test positive and Chronic hepatitis C without hepatic coma were pertinent to this visit.    PCP: Yash Gonzalez   [id number] [facility] [place] / [place] [address]    HPI:  This is a 67 y.o. female here for evaluation of the above  Pt referred by PCP for further management of HCV  She was diagnosed recently with labs, no previous treatment.   Labs consistent with active HCV.   Fibroscan without evidence of advanced fibrosis .   No upper GI bleeding.  No ascites or BLE.  No overt confusion.      Review of Systems   Constitutional:  Negative for chills, fever, malaise/fatigue and weight loss.   Respiratory:  Negative for cough.    Cardiovascular:  Negative for chest pain.   Gastrointestinal:         Per HPI   Musculoskeletal:  Negative for myalgias.   Skin:  Negative for itching and rash.   Neurological:  Negative for headaches.   Psychiatric/Behavioral:  The patient is not nervous/anxious.      Medical History:   Past Medical History:   Diagnosis Date    Adrenal hyperplasia, congenital        Surgical History:  Past Surgical History:   Procedure Laterality Date    GALLBLADDER SURGERY N/A 2008    MOUTH SURGERY Right 2022       Family History:   Family History   Problem Relation Age of Onset    Alcohol abuse Mother     COPD Mother     Hypertension Mother     Alcohol abuse Father     COPD Father     Diabetes Father        Social History:   Social History     Tobacco Use    Smoking status: Former    Smokeless tobacco: Never   Substance Use Topics    Alcohol use: Never    Drug use: Yes     Types: Marijuana       Allergies: Reviewed    Home Medications:   Current Outpatient Medications on File Prior to Visit   Medication Sig Dispense Refill    multivitamin capsule Take 1 capsule by mouth once daily.      predniSONE (DELTASONE) 5 MG tablet Take 1 tablet (5 mg total) by mouth once daily. 90 tablet 4    risedronate  "(ACTONEL) 150 MG Tab Take one tablet monthly on an empty stomach, with 8 ounces of water. Remain upright and avoid food or drink for 45 minutes after taking this drug. 1 tablet 11    [DISCONTINUED] sofosbuvir-velpatasvir (EPCLUSA) 400-100 mg Tab Take 1 tablet by mouth once daily. 28 tablet 2     No current facility-administered medications on file prior to visit.       Physical Exam:  Vital Signs:  /68   Pulse 75   Ht 5' 2" (1.575 m)   Wt 47.9 kg (105 lb 9.6 oz)   BMI 19.31 kg/m²   Body mass index is 19.31 kg/m².  Physical Exam  Vitals reviewed.   Constitutional:       Appearance: She is well-developed.   HENT:      Head: Normocephalic.   Eyes:      General: No scleral icterus.  Cardiovascular:      Rate and Rhythm: Normal rate.   Pulmonary:      Effort: Pulmonary effort is normal.   Abdominal:      General: There is no distension.      Palpations: Abdomen is soft.   Musculoskeletal:         General: Normal range of motion.      Cervical back: Normal range of motion.   Skin:     General: Skin is dry.   Neurological:      Mental Status: She is alert and oriented to person, place, and time.       Labs: Pertinent labs reviewed.      Assessment:  1. Hepatitis C antibody test positive    2. Chronic hepatitis C without hepatic coma         Recommendations:  -Educated patient on HCV, natural history and treatment options  - pt is a good candidate for treatment  - will get hep B Core ab.  If positive, will need labs 6 weeks after starting treatment.    - plan for treatment with Epclusa. Side effects and post-treatment plan discussed.       Follow up to be determined by results of above.      Thank you so much for allowing me to participate in the care of CHIKIS Hall    "

## 2023-07-27 ENCOUNTER — TELEPHONE (OUTPATIENT)
Dept: PHARMACY | Facility: CLINIC | Age: 68
End: 2023-07-27
Payer: COMMERCIAL

## 2023-07-27 PROBLEM — B18.2 CHRONIC HEPATITIS C WITHOUT HEPATIC COMA: Status: ACTIVE | Noted: 2023-07-27

## 2023-07-27 NOTE — TELEPHONE ENCOUNTER
Siobhan, this is Deidra Lei, clinical pharmacist with [facility] that is part of your care team.  We have begun working on your prescription [EPCLUSA] that your doctor has sent us. Our next steps include:     Working with your insurance company to obtain approval for your medication  Working with you to ensure your medication is affordable     We will be calling you along the way with updates on your medication but if you have any concerns or receive information that you would like to discuss please reach us at [phone].    Welcome call outcome: Patient/caregiver reached

## 2023-08-01 ENCOUNTER — PATIENT MESSAGE (OUTPATIENT)
Dept: HEPATOLOGY | Facility: CLINIC | Age: 68
End: 2023-08-01
Payer: COMMERCIAL

## 2023-08-03 ENCOUNTER — TELEPHONE (OUTPATIENT)
Dept: HEPATOLOGY | Facility: CLINIC | Age: 68
End: 2023-08-03
Payer: COMMERCIAL

## 2023-08-03 ENCOUNTER — SPECIALTY PHARMACY (OUTPATIENT)
Dept: PHARMACY | Facility: CLINIC | Age: 68
End: 2023-08-03
Payer: COMMERCIAL

## 2023-08-03 DIAGNOSIS — B18.2 CHRONIC HEPATITIS C WITHOUT HEPATIC COMA: Primary | ICD-10-CM

## 2023-08-03 DIAGNOSIS — R76.8 HEPATITIS C ANTIBODY TEST POSITIVE: ICD-10-CM

## 2023-08-03 RX ORDER — VELPATASVIR AND SOFOSBUVIR 100; 400 MG/1; MG/1
1 TABLET, FILM COATED ORAL DAILY
Qty: 28 TABLET | Refills: 2 | Status: ACTIVE | OUTPATIENT
Start: 2023-08-03 | End: 2023-11-17 | Stop reason: SDUPTHER

## 2023-08-03 NOTE — TELEPHONE ENCOUNTER
Called PA dept to submit BRAND Epclusa PA via the phone ([phone]). Completed PA with rep (Maci). PA #: [id number]. Approved through 7/2/23-10/24/23.     BRAND Epclusa test claim (DAW1) - NOT COVERED AT THIS LOCATION. Contacted Rx help desk and patient is locked into receiving medication from [facility].     Rx routed to [place], [place] - [address] [place] [address] - Phone: [phone]. Receipt confirmed by pharmacy (8/3/2023  3:42 PM CDT).     Unable to obtain copay amount but patient seems to have commercial plan and would be eligible for Belanitlusa copay card. Pt can apply at [email or web address]. Should bring cost down to $5 if needed.     Attempted to notify patient of the above. NA, ROXANA.

## 2023-08-03 NOTE — TELEPHONE ENCOUNTER
----- Message from Deidra Lei PharmD sent at 8/3/2023  1:02 PM CDT -----  Regarding: FW: Epclusa  Wanted to f/u on the below message.     Thanks,   Deidra Lei, Pharm D  [facility]   [phone]    ----- Message -----  From: Deidra Lei PharmD  Sent: 8/1/2023   3:25 PM CDT  To: FROYLAN Mccain  Subject: Epclusa                                          Good afternoon,     I was able to get Epclusa approved through insurance but the require patient to receive BRAND Epclusa. Can you resend the Rx with the LETITIA box checked off please?    Thanks,   Deidra Lei, Pharm D  [facility]   [phone]

## 2023-08-15 ENCOUNTER — PATIENT MESSAGE (OUTPATIENT)
Dept: HEPATOLOGY | Facility: CLINIC | Age: 68
End: 2023-08-15
Payer: COMMERCIAL

## 2023-08-18 ENCOUNTER — CLINICAL SUPPORT (OUTPATIENT)
Dept: FAMILY MEDICINE | Facility: CLINIC | Age: 68
End: 2023-08-18
Payer: COMMERCIAL

## 2023-08-18 DIAGNOSIS — Z23 IMMUNIZATION DUE: ICD-10-CM

## 2023-08-18 PROCEDURE — 99999 PR PBB SHADOW E&M-EST. PATIENT-LVL II: CPT | Mod: PBBFAC,,,

## 2023-08-18 PROCEDURE — 90739 HEPB VACC 2/4 DOSE ADULT IM: CPT | Mod: S$GLB,,, | Performed by: FAMILY MEDICINE

## 2023-08-18 PROCEDURE — 90471 HEPATITIS B (RECOMBINANT) ADJUVANTED, 2 DOSE: ICD-10-PCS | Mod: S$GLB,,, | Performed by: FAMILY MEDICINE

## 2023-08-18 PROCEDURE — 90739 HEPATITIS B (RECOMBINANT) ADJUVANTED, 2 DOSE: ICD-10-PCS | Mod: S$GLB,,, | Performed by: FAMILY MEDICINE

## 2023-08-18 PROCEDURE — 90471 IMMUNIZATION ADMIN: CPT | Mod: S$GLB,,, | Performed by: FAMILY MEDICINE

## 2023-08-18 PROCEDURE — 99999 PR PBB SHADOW E&M-EST. PATIENT-LVL II: ICD-10-PCS | Mod: PBBFAC,,,

## 2023-10-06 ENCOUNTER — PATIENT MESSAGE (OUTPATIENT)
Dept: HEPATOLOGY | Facility: CLINIC | Age: 68
End: 2023-10-06
Payer: COMMERCIAL

## 2023-11-17 ENCOUNTER — PATIENT MESSAGE (OUTPATIENT)
Dept: HEPATOLOGY | Facility: CLINIC | Age: 68
End: 2023-11-17
Payer: COMMERCIAL

## 2023-11-17 DIAGNOSIS — B18.2 CHRONIC HEPATITIS C WITHOUT HEPATIC COMA: ICD-10-CM

## 2023-11-17 RX ORDER — VELPATASVIR AND SOFOSBUVIR 100; 400 MG/1; MG/1
1 TABLET, FILM COATED ORAL DAILY
Qty: 28 TABLET | Refills: 1 | Status: SHIPPED | OUTPATIENT
Start: 2023-11-17 | End: 2024-02-23 | Stop reason: ALTCHOICE

## 2023-11-20 ENCOUNTER — PATIENT MESSAGE (OUTPATIENT)
Dept: HEPATOLOGY | Facility: CLINIC | Age: 68
End: 2023-11-20
Payer: COMMERCIAL

## 2023-12-07 ENCOUNTER — TELEPHONE (OUTPATIENT)
Dept: HEPATOLOGY | Facility: CLINIC | Age: 68
End: 2023-12-07
Payer: COMMERCIAL

## 2023-12-07 ENCOUNTER — PATIENT MESSAGE (OUTPATIENT)
Dept: HEPATOLOGY | Facility: CLINIC | Age: 68
End: 2023-12-07
Payer: COMMERCIAL

## 2023-12-07 NOTE — TELEPHONE ENCOUNTER
Attempt to contact patient on [phone], but to no avail. M for patient to return call at earliest convenience.      ----- Message from FROYLAN Mccain sent at 12/7/2023  1:01 PM CST -----  Contact: nando warner/charli lazo  Can you clarifiy with the pt if she completed 3 months of treatment?  If so, then she does not need any further refills    ----- Message -----  From: Orly Bear MA  Sent: 12/7/2023  10:46 AM CST  To: FROYLAN Mccain    Pharmacy inquiring if the patient needs any refills. Please advise.   ----- Message -----  From: Nelson Tilley  Sent: 12/7/2023  10:10 AM CST  To: Christen LUGO Staff    Type:  Pharmacy Calling to Clarify an RX  Name of Caller:nando  [facility]  Prescription Name: Disp Refills Start End   EPCLUSA 400-100 mg Tab       What do they need to clarify?:if the patients needs refills  Best Call Back Number:[phone] click 1 for pharmacy  Additional Information:

## 2023-12-08 ENCOUNTER — TELEPHONE (OUTPATIENT)
Dept: HEPATOLOGY | Facility: CLINIC | Age: 68
End: 2023-12-08
Payer: COMMERCIAL

## 2023-12-08 NOTE — TELEPHONE ENCOUNTER
Message sent to patient to inquire of treatment. LVM for patient to return call due to mailbox not coming on after one ring. Patient last active 12/7/2023.    ----- Message from Dion Delarosa sent at 12/7/2023  3:10 PM CST -----  Contact: good  .Type:  Patient Returning Call    Who Called:good  Who Left Message for Patient:re  Does the patient know what this is regarding?:no  Would the patient rather a call back or a response via [facility]? Call back  Best Call Back Number:[phone]   Additional Information: n/a          Thanks  DD

## 2023-12-28 ENCOUNTER — PATIENT MESSAGE (OUTPATIENT)
Dept: FAMILY MEDICINE | Facility: CLINIC | Age: 68
End: 2023-12-28
Payer: COMMERCIAL

## 2024-02-23 ENCOUNTER — LAB VISIT (OUTPATIENT)
Dept: LAB | Facility: HOSPITAL | Age: 69
End: 2024-02-23
Attending: FAMILY MEDICINE
Payer: COMMERCIAL

## 2024-02-23 ENCOUNTER — OFFICE VISIT (OUTPATIENT)
Dept: FAMILY MEDICINE | Facility: CLINIC | Age: 69
End: 2024-02-23
Payer: COMMERCIAL

## 2024-02-23 VITALS
DIASTOLIC BLOOD PRESSURE: 80 MMHG | BODY MASS INDEX: 18.7 KG/M2 | WEIGHT: 101.63 LBS | HEIGHT: 62 IN | HEART RATE: 82 BPM | SYSTOLIC BLOOD PRESSURE: 136 MMHG | OXYGEN SATURATION: 97 %

## 2024-02-23 DIAGNOSIS — B18.2 CHRONIC HEPATITIS C WITHOUT HEPATIC COMA: ICD-10-CM

## 2024-02-23 DIAGNOSIS — Z13.220 ENCOUNTER FOR LIPID SCREENING FOR CARDIOVASCULAR DISEASE: ICD-10-CM

## 2024-02-23 DIAGNOSIS — Z13.6 ENCOUNTER FOR LIPID SCREENING FOR CARDIOVASCULAR DISEASE: ICD-10-CM

## 2024-02-23 DIAGNOSIS — Z79.899 ENCOUNTER FOR LONG-TERM (CURRENT) USE OF MEDICATIONS: Chronic | ICD-10-CM

## 2024-02-23 DIAGNOSIS — Z79.52 LONG TERM (CURRENT) USE OF SYSTEMIC STEROIDS: Chronic | ICD-10-CM

## 2024-02-23 DIAGNOSIS — E25.0 CONGENITAL ADRENAL HYPERPLASIA: ICD-10-CM

## 2024-02-23 DIAGNOSIS — B18.2 CHRONIC HEPATITIS C WITHOUT HEPATIC COMA: Primary | ICD-10-CM

## 2024-02-23 DIAGNOSIS — E55.9 VITAMIN D DEFICIENCY: ICD-10-CM

## 2024-02-23 LAB
25(OH)D3+25(OH)D2 SERPL-MCNC: 32 NG/ML (ref 30–96)
ALBUMIN SERPL BCP-MCNC: 4.4 G/DL (ref 3.5–5.2)
ALP SERPL-CCNC: 63 U/L (ref 55–135)
ALT SERPL W/O P-5'-P-CCNC: 10 U/L (ref 10–44)
ANION GAP SERPL CALC-SCNC: 12 MMOL/L (ref 8–16)
AST SERPL-CCNC: 19 U/L (ref 10–40)
BASOPHILS # BLD AUTO: 0.04 K/UL (ref 0–0.2)
BASOPHILS NFR BLD: 0.4 % (ref 0–1.9)
BILIRUB SERPL-MCNC: 0.3 MG/DL (ref 0.1–1)
BUN SERPL-MCNC: 16 MG/DL (ref 8–23)
CALCIUM SERPL-MCNC: 9.9 MG/DL (ref 8.7–10.5)
CHLORIDE SERPL-SCNC: 106 MMOL/L (ref 95–110)
CHOLEST SERPL-MCNC: 243 MG/DL (ref 120–199)
CHOLEST/HDLC SERPL: 4.9 {RATIO} (ref 2–5)
CO2 SERPL-SCNC: 22 MMOL/L (ref 23–29)
CREAT SERPL-MCNC: 0.9 MG/DL (ref 0.5–1.4)
DHEA-S SERPL-MCNC: 90.5 UG/DL (ref 33.6–78.9)
DIFFERENTIAL METHOD BLD: ABNORMAL
EOSINOPHIL # BLD AUTO: 0.2 K/UL (ref 0–0.5)
EOSINOPHIL NFR BLD: 2.4 % (ref 0–8)
ERYTHROCYTE [DISTWIDTH] IN BLOOD BY AUTOMATED COUNT: 13 % (ref 11.5–14.5)
EST. GFR  (NO RACE VARIABLE): >60 ML/MIN/1.73 M^2
ESTIMATED AVG GLUCOSE: 108 MG/DL (ref 68–131)
GLUCOSE SERPL-MCNC: 104 MG/DL (ref 70–110)
HBA1C MFR BLD: 5.4 % (ref 4–5.6)
HCT VFR BLD AUTO: 45.4 % (ref 37–48.5)
HDLC SERPL-MCNC: 50 MG/DL (ref 40–75)
HDLC SERPL: 20.6 % (ref 20–50)
HGB BLD-MCNC: 15.2 G/DL (ref 12–16)
IMM GRANULOCYTES # BLD AUTO: 0.01 K/UL (ref 0–0.04)
IMM GRANULOCYTES NFR BLD AUTO: 0.1 % (ref 0–0.5)
INR PPP: 1 (ref 0.8–1.2)
LDLC SERPL CALC-MCNC: 164.6 MG/DL (ref 63–159)
LYMPHOCYTES # BLD AUTO: 2.8 K/UL (ref 1–4.8)
LYMPHOCYTES NFR BLD: 30.1 % (ref 18–48)
MCH RBC QN AUTO: 32.3 PG (ref 27–31)
MCHC RBC AUTO-ENTMCNC: 33.5 G/DL (ref 32–36)
MCV RBC AUTO: 96 FL (ref 82–98)
MONOCYTES # BLD AUTO: 0.7 K/UL (ref 0.3–1)
MONOCYTES NFR BLD: 7.8 % (ref 4–15)
NEUTROPHILS # BLD AUTO: 5.5 K/UL (ref 1.8–7.7)
NEUTROPHILS NFR BLD: 59.2 % (ref 38–73)
NONHDLC SERPL-MCNC: 193 MG/DL
NRBC BLD-RTO: 0 /100 WBC
PLATELET # BLD AUTO: 242 K/UL (ref 150–450)
PMV BLD AUTO: 10.4 FL (ref 9.2–12.9)
POTASSIUM SERPL-SCNC: 3.3 MMOL/L (ref 3.5–5.1)
PROT SERPL-MCNC: 7.8 G/DL (ref 6–8.4)
PROTHROMBIN TIME: 10.4 SEC (ref 9–12.5)
RBC # BLD AUTO: 4.71 M/UL (ref 4–5.4)
SODIUM SERPL-SCNC: 140 MMOL/L (ref 136–145)
TESTOST SERPL-MCNC: 117 NG/DL (ref 5–73)
TRIGL SERPL-MCNC: 142 MG/DL (ref 30–150)
TSH SERPL DL<=0.005 MIU/L-ACNC: 2.04 UIU/ML (ref 0.4–4)
WBC # BLD AUTO: 9.34 K/UL (ref 3.9–12.7)

## 2024-02-23 PROCEDURE — 85610 PROTHROMBIN TIME: CPT | Performed by: FAMILY MEDICINE

## 2024-02-23 PROCEDURE — 1159F MED LIST DOCD IN RCRD: CPT | Mod: CPTII,S$GLB,, | Performed by: FAMILY MEDICINE

## 2024-02-23 PROCEDURE — 1160F RVW MEDS BY RX/DR IN RCRD: CPT | Mod: CPTII,S$GLB,, | Performed by: FAMILY MEDICINE

## 2024-02-23 PROCEDURE — 82306 VITAMIN D 25 HYDROXY: CPT | Performed by: FAMILY MEDICINE

## 2024-02-23 PROCEDURE — 82157 ASSAY OF ANDROSTENEDIONE: CPT | Performed by: FAMILY MEDICINE

## 2024-02-23 PROCEDURE — 99214 OFFICE O/P EST MOD 30 MIN: CPT | Mod: S$GLB,,, | Performed by: FAMILY MEDICINE

## 2024-02-23 PROCEDURE — 85025 COMPLETE CBC W/AUTO DIFF WBC: CPT | Performed by: FAMILY MEDICINE

## 2024-02-23 PROCEDURE — 3075F SYST BP GE 130 - 139MM HG: CPT | Mod: CPTII,S$GLB,, | Performed by: FAMILY MEDICINE

## 2024-02-23 PROCEDURE — 80061 LIPID PANEL: CPT | Performed by: FAMILY MEDICINE

## 2024-02-23 PROCEDURE — 84403 ASSAY OF TOTAL TESTOSTERONE: CPT | Performed by: FAMILY MEDICINE

## 2024-02-23 PROCEDURE — 83036 HEMOGLOBIN GLYCOSYLATED A1C: CPT | Performed by: FAMILY MEDICINE

## 2024-02-23 PROCEDURE — 3079F DIAST BP 80-89 MM HG: CPT | Mod: CPTII,S$GLB,, | Performed by: FAMILY MEDICINE

## 2024-02-23 PROCEDURE — 1101F PT FALLS ASSESS-DOCD LE1/YR: CPT | Mod: CPTII,S$GLB,, | Performed by: FAMILY MEDICINE

## 2024-02-23 PROCEDURE — 36415 COLL VENOUS BLD VENIPUNCTURE: CPT | Mod: PO | Performed by: FAMILY MEDICINE

## 2024-02-23 PROCEDURE — 3288F FALL RISK ASSESSMENT DOCD: CPT | Mod: CPTII,S$GLB,, | Performed by: FAMILY MEDICINE

## 2024-02-23 PROCEDURE — 80053 COMPREHEN METABOLIC PANEL: CPT | Performed by: FAMILY MEDICINE

## 2024-02-23 PROCEDURE — 82627 DEHYDROEPIANDROSTERONE: CPT | Performed by: FAMILY MEDICINE

## 2024-02-23 PROCEDURE — 99999 PR PBB SHADOW E&M-EST. PATIENT-LVL IV: CPT | Mod: PBBFAC,,, | Performed by: FAMILY MEDICINE

## 2024-02-23 PROCEDURE — 3008F BODY MASS INDEX DOCD: CPT | Mod: CPTII,S$GLB,, | Performed by: FAMILY MEDICINE

## 2024-02-23 PROCEDURE — 1126F AMNT PAIN NOTED NONE PRSNT: CPT | Mod: CPTII,S$GLB,, | Performed by: FAMILY MEDICINE

## 2024-02-23 PROCEDURE — 83498 ASY HYDROXYPROGESTERONE 17-D: CPT | Performed by: FAMILY MEDICINE

## 2024-02-23 PROCEDURE — 84443 ASSAY THYROID STIM HORMONE: CPT | Performed by: FAMILY MEDICINE

## 2024-02-23 PROCEDURE — 87522 HEPATITIS C REVRS TRNSCRPJ: CPT | Performed by: FAMILY MEDICINE

## 2024-02-23 NOTE — PATIENT INSTRUCTIONS
Garrett Garcia,     If you are due for any health screening(s) below please notify me so we can arrange them to be ordered and scheduled. Most healthy patients at your age complete them, but you are free to accept or refuse.     If you can't do it, I'll definitely understand. If you can, I'd certainly appreciate it!    Tests to Keep You Healthy    Mammogram: ORDERED BUT NOT SCHEDULED

## 2024-02-23 NOTE — PROGRESS NOTES
PLAN:                       Assessment & Pl  1. Hepatitis C.                                                      Follow-up                                                                                                                                                                                                                                                                                                                                                                                                                                                                                                                                                                                                                                                                                                                                                                                                                                                                                                                                                                                                                                                                                                                                                                                                                                                                                                                                                                                                                                                                                                                                                                                                                                                                                                                                                                                                                                                                                                                    Date      Specialty               2/2                                                         Medication Management for assessment  Medication List wit                           MULTIVITAMIN CAPSULE                                                         PREDNISONE (DELTASONE) 5 MG TABLET                                                     RISEDRONATE (ACTONEL) 150 MG TAB                                                                                                                         Take one tablet month                             EPCLUSA 400-100 MG TAB                                              Yash Gonzalez M.D.                                                     ===========  Subjective:                                                                                                           Patient ID: Radha Jha is a 68 y.o. femal                                  History of Present Illness                                                                                                                                                                                                                                                                                                                                                                                                                                                                                                                                                                                                                                                                                                                                                                                                                                                                                                                                                                                                                                                                                                                                                                                                                                                                                                                                                                                                                                                                                                                                                                                                                                                  February 2024: Reviewed labs.  July 2023:  Reviewed labs.  CHRON  Routine lab  Lab Resul   Tat Momoli Valu        WBC  8.97              HGB  14.5              HCT  42.6              MC 95                                                         Tat Momoli Value           NA  142                  K   3.6                   CL  106                    CO 24                     BU 12                            CRE 0.9                    GL 97                               Tat Momoli Value                SESAR 9.4                        AL 64                     AS 30                     AL 32                         JANE 0.3                             ESTGF >60.0                        EGFRN >60.0                              Lab Resul   Tat Momoli Valu        TSH   1.843                                                                                                                                                                                                                                                                                                                                                                                                                                                                                                                                                                                                                                                                                                                                                                    DXA Bone Density Spine   Narrative: EXAMINATION:                           CLINICAL HISTORY:                            TECHNIQUE:                                                                                                                                            COMP               FINDINGS:                                                                                                                                                                                                                                                                                          The total hip bone                                                                                                                                                                                          *A hip or vertebral (clinical or morphometric) fracture                                                                     *T score less than or equal to -2.5 at the femoral neck or spine after appropriate evaluation to exclude secondary causes.                                                                                                                                                                                         *Low bone mass -- also known as osteopenia (T score between -1.0 and -2.5 at the femoral neck or spine) and a 10 year probability of hip fracture gr                                                    Electronically sign  Date:    06/14                                                                                                                                                                                                                                                                                                                                                                                                                                                                                                                                                   Chronic. Vi  Lab Resul   Linden Valu                 VI 48         0                                                                                                                                                                                                                           Review of patient'  No Known Allergies              Current Ou   Medication   I                       multivitamin capsule   1                         predniSONE (DELTA 5                                   risedronate (ACTONEL) 150 MG Tab                                                                                                                   T                                                                                                                                                                    I have reviewed th  Review of Systems                                                             Constitutional:  Negative for activity change and unexpected weight ch  HENT:  Negative for hearing loss, rhinorrhea and troub  Eyes:  Negative for discharge and visual disturbance.       Respiratory:  Negative for chest tightness and wheezing.     Cardiovascular:  Negative for chest pain and palpitations.                             Gastrointestinal:  Negative for blood in stool, c  Endocrine: Negative for polydipsia and polyuria.                                               Genitourinary:  Negative for difficulty urinating, dysuria, hematuria and   Musculoskeletal:  Negative for arthralgias, joint sw  Neurological:  Negative for weakness and headaches.                                  Objective:                                                                                                  /80     Physical Exam                       Vitals and  nursin  Constitutional:                               General: She is not in acute distress.                                                            Cy  HENT:                                       Head: Normocephalic and atraumatic.              Right Ear: Hearing and external ear normal.     Left Ear: Hearing and external ear     Nos  Eyes:                             General: Lids are normal.                                 Extraocular Movements: Extraocular moveme     Conjunctiva/sclera: Conjunctivae normal.                     Pupils: Pupil  Cardiovascular:                       Rate and Rhythm: Normal     Pulses:   Pulmonary:                                                           Effort: Pulmonary effort is normal. N     Breath s  Abdominal:                                General: Bowel sounds are nor     Palpations: Abdom  Musculoskeletal:                         General: Normal range of motion.                            Ce  Skin:                                  General: Skin is warm and dry.                                     Capillary Refill: Capillary re     Coloration:  Neurological:                              General: No focal deficit present.                                                                Mental Status: She is alert and oriented t     Cranial Nerves: No c     Motor: No weakness.     Gait: Gait no  Psychiatric:                                           Attention and Perception: She is attentive.                             Mood and Affect: Mood normal. Mood is not anxious or depr     Speech: Speech is not rapid and pressured or slurred.                                                            Behavior: Behavior normal. Behavior is not agitated, aggressive or hyperactive. Behavior is cooperative.                                                                                                                      Thought Content: Thought content normal. Thought      Cognition  and Memory: Memor                     Physic                                     1.                                        Ch   2.                                                    En   3.                                                        En   4.                              Co   5.                                            Lo   6.                             MDM:                                           Moderate medical complexity.  Moderate risk.                                                                                                                                                                                                                                                                                                                                                                                                                                                                                     Total time: 21 minutes.  This includes tota  I have Reviewed and summarized old records.                                                                I have performed thorough medication reconciliation today and discussed risk a  I have reviewed labs and discussed with patient.  All questions were answered.                                                                   I have signed for the follow  Orders Guilherme   P                                                            Standi                              N                                 Standing  4                                     Standi                              N                                 Standing  4                                               Standi                              N                                 Standing  4                                                       Standi                              N                                 Standing  4                             Standi                              N                                  Standing  4                                        Standi                              N                                 Standing  4                                     Standi                              N                                 Standing  4                                   Standi                              N                                 Standing  2                                      Standi                              N                                 Standing  4                                      Standi                              N                                 Standing  4                                                Standi                              N                                 Standing  4                                         Standi                              N                                 Standing                                                                                                                              Follow up in about 1            Date      Specialty               2/2                                                                                                                                                                                       If no improvement in symptoms or symptoms worsen, advised to call/follow-up at clinic or go to ER. Patient voiced understanding and all questions/concerns were addressed.                                                               DISCLAIMER: This note was compiled by using a speech recognition                                                          Office: [phone] [id number] [facility]  [place] [id number]

## 2024-02-23 NOTE — ASSESSMENT & PLAN NOTE
Check levels.    Previous plan:  Patient agrees to E consult for congenital adrenal hyperplasia.  Consider labs and imaging to monitor this condition.  Continue prednisone.    Addendum:  E consult recommends in-person Endocrinology appointment.  Will check labs prior to this appointment.  Proceed with bone density scan given her long-term steroid usage.ACTH, DHEA-S, androstenedione, 17 hydroxy progesterone, and testosterone

## 2024-02-25 NOTE — PROGRESS NOTES
Make follow-up lab appointment per recommendation below.  Check to see if patient has seen the results through my chart.  If not then,  #CALL THE PATIENT# to discuss results/see if they have questions and document verification of contact. Make F/U appt if needed. [phone]    #My interpretation that was sent to them through [facility]:  Adore, I have reviewed your recent blood work.  Some of the blood test are still pending.    PT INR is normal.  Vitamin-D level is within normal range.  Add additional vitamin-D supplement over-the-counter 1000 units daily.  DHEA level is elevated from previous.  Testosterone level is stable.  Your complete blood count is normal.    Your metabolic panel which shows your glucose, kidney function, electrolytes, and liver function is mostly within normal limits except for low potassium.  Need to start on potassium supplement.  You can find this over-the-counter.  Thyroid study is normal.   Your cholesterol is elevated from previous.  Risk score is elevated.  I recommend lifestyle modification with low-fat high-fiber diet and increase in exercise.  If no improvement in 3 to 6 months of on lipid panel will need to consider starting medication to lower These levels to reduce risk of heart attacks and strokes.  Your hemoglobin A1c is normal.  This test is gold standard screening test for diabetes.  It is a measures 3 months of your average blood sugar.    =========================  Also please address any outstanding health maintenance that may be due: RSV Vaccine (Age 60+ and Pregnant patients)(1 - 1-dose 60+ series) Never done

## 2024-02-26 LAB
17OHP SERPL-MCNC: ABNORMAL NG/DL (ref 32–272)
HCV RNA SERPL QL NAA+PROBE: NOT DETECTED
HCV RNA SPEC NAA+PROBE-ACNC: NOT DETECTED IU/ML

## 2024-02-26 NOTE — PROGRESS NOTES
1st check to see if patient has seen the results.  If not then  CALL patient with results and Document verification.  Schedule follow-up if needed.  [phone]  Hepatitis-C has been cured.  No viral load has been detected.

## 2024-02-27 ENCOUNTER — TELEPHONE (OUTPATIENT)
Dept: FAMILY MEDICINE | Facility: CLINIC | Age: 69
End: 2024-02-27
Payer: COMMERCIAL

## 2024-02-27 NOTE — TELEPHONE ENCOUNTER
----- Message from Salinas Pepper sent at 2/27/2024 10:16 AM CST -----  Contact: Radha  Type:  Patient Returning Call    Who Called:radha  Who Left Message for Patient:nurse  Does the patient know what this is regarding?:missed call  Would the patient rather a call back or a response via [facility]? call  Best Call Back Number:[phone]   Additional Information:

## 2024-02-27 NOTE — TELEPHONE ENCOUNTER
----- Message from Francesca Washington sent at 2/27/2024  8:28 AM CST -----  Contact: radha  .Type:  Patient Returning Call    Who Called: Radha   Who Left Message for Patient: One[place]   Does the patient know what this is regarding?: Unknown   Would the patient rather a call back or a response via [facility]?  Call   Best Call Back Number: .[phone]   Additional Information:

## 2024-02-29 LAB — ANDROST SERPL-MCNC: 578 NG/DL

## 2024-03-01 NOTE — PROGRESS NOTES
1st check to see if patient has seen the results.  If not then  CALL patient with results and Document verification.  Schedule follow-up if needed.  [phone]    Elevated levels consistent with diagnosis.

## 2024-05-18 ENCOUNTER — PATIENT MESSAGE (OUTPATIENT)
Dept: FAMILY MEDICINE | Facility: CLINIC | Age: 69
End: 2024-05-18
Payer: COMMERCIAL

## 2024-06-22 DIAGNOSIS — E27.8 ADRENAL HYPERPLASIA: ICD-10-CM

## 2024-06-22 NOTE — TELEPHONE ENCOUNTER
No care due was identified.  [facility] Embedded Care Due Messages. Reference number: [id number].   6/22/2024 10:16:12 AM CDT

## 2024-06-24 RX ORDER — PREDNISONE 5 MG/1
5 TABLET ORAL
Qty: 90 TABLET | Refills: 4 | Status: SHIPPED | OUTPATIENT
Start: 2024-06-24

## 2024-06-24 NOTE — TELEPHONE ENCOUNTER
Refill Routing Note   Medication(s) are not appropriate for processing by [facility] for the following reason(s):        Outside of protocol    [facility] action(s):  Route               Appointments  past 12m or future 3m with PCP    Date Provider   Last Visit   2/23/2024 Yash Gonzalez MD   Next Visit   Visit date not found Yash Gonzalez MD   ED visits in past 90 days: 0        Note composed:10:24 AM 06/24/2024

## 2024-09-11 ENCOUNTER — PATIENT MESSAGE (OUTPATIENT)
Dept: FAMILY MEDICINE | Facility: CLINIC | Age: 69
End: 2024-09-11
Payer: COMMERCIAL

## 2024-11-17 ENCOUNTER — PATIENT MESSAGE (OUTPATIENT)
Dept: FAMILY MEDICINE | Facility: CLINIC | Age: 69
End: 2024-11-17
Payer: COMMERCIAL

## 2025-03-14 ENCOUNTER — PATIENT MESSAGE (OUTPATIENT)
Dept: FAMILY MEDICINE | Facility: CLINIC | Age: 70
End: 2025-03-14
Payer: COMMERCIAL

## 2025-06-16 ENCOUNTER — TELEPHONE (OUTPATIENT)
Dept: FAMILY MEDICINE | Facility: CLINIC | Age: 70
End: 2025-06-16
Payer: COMMERCIAL

## 2025-06-16 NOTE — TELEPHONE ENCOUNTER
Copied from CRM #[id number]. Topic: Appointments - Appointment Access  >> Jun 16, 2025 10:06 AM Marti wrote:  .TYPE: Patient Call Back      Who called:Patient      What is the request in detail: Patient running late for appt Patient is stuck in traffic for over a hour please give call back. TY     Can the clinic reply by [facility]?         Would the patient rather a call back or a response via [facility]?CALL      Best call back number:.[phone] (home)

## 2025-06-19 ENCOUNTER — OFFICE VISIT (OUTPATIENT)
Dept: FAMILY MEDICINE | Facility: CLINIC | Age: 70
End: 2025-06-19
Payer: COMMERCIAL

## 2025-06-19 ENCOUNTER — LAB VISIT (OUTPATIENT)
Dept: LAB | Facility: HOSPITAL | Age: 70
End: 2025-06-19
Attending: NURSE PRACTITIONER
Payer: COMMERCIAL

## 2025-06-19 VITALS
BODY MASS INDEX: 19.02 KG/M2 | HEART RATE: 82 BPM | SYSTOLIC BLOOD PRESSURE: 126 MMHG | HEIGHT: 62 IN | TEMPERATURE: 98 F | DIASTOLIC BLOOD PRESSURE: 70 MMHG | WEIGHT: 103.38 LBS | RESPIRATION RATE: 16 BRPM | OXYGEN SATURATION: 99 %

## 2025-06-19 DIAGNOSIS — Z79.899 ENCOUNTER FOR LONG-TERM (CURRENT) USE OF MEDICATIONS: ICD-10-CM

## 2025-06-19 DIAGNOSIS — Z53.20 COLON CANCER SCREENING DECLINED: ICD-10-CM

## 2025-06-19 DIAGNOSIS — F12.90 MARIJUANA USE: ICD-10-CM

## 2025-06-19 DIAGNOSIS — M81.0 AGE-RELATED OSTEOPOROSIS WITHOUT CURRENT PATHOLOGICAL FRACTURE: ICD-10-CM

## 2025-06-19 DIAGNOSIS — E25.0 CONGENITAL ADRENAL HYPERPLASIA: Primary | ICD-10-CM

## 2025-06-19 DIAGNOSIS — Z53.20 MAMMOGRAM DECLINED: ICD-10-CM

## 2025-06-19 DIAGNOSIS — E78.5 HYPERLIPIDEMIA, UNSPECIFIED HYPERLIPIDEMIA TYPE: ICD-10-CM

## 2025-06-19 PROBLEM — E27.8 ADRENAL HYPERPLASIA: Status: RESOLVED | Noted: 2023-06-13 | Resolved: 2025-06-19

## 2025-06-19 PROBLEM — Z23 NEED FOR VACCINATION: Status: RESOLVED | Noted: 2022-06-13 | Resolved: 2025-06-19

## 2025-06-19 PROBLEM — Z78.0 MENOPAUSE: Chronic | Status: RESOLVED | Noted: 2022-06-13 | Resolved: 2025-06-19

## 2025-06-19 LAB
ALBUMIN SERPL BCP-MCNC: 4.1 G/DL (ref 3.5–5.2)
ALP SERPL-CCNC: 65 UNIT/L (ref 40–150)
ALT SERPL W/O P-5'-P-CCNC: 10 UNIT/L (ref 10–44)
ANION GAP (OHS): 11 MMOL/L (ref 8–16)
AST SERPL-CCNC: 15 UNIT/L (ref 11–45)
BILIRUB SERPL-MCNC: 0.3 MG/DL (ref 0.1–1)
BUN SERPL-MCNC: 20 MG/DL (ref 8–23)
CALCIUM SERPL-MCNC: 9.1 MG/DL (ref 8.7–10.5)
CHLORIDE SERPL-SCNC: 103 MMOL/L (ref 95–110)
CHOLEST SERPL-MCNC: 271 MG/DL (ref 120–199)
CHOLEST/HDLC SERPL: 4.6 {RATIO} (ref 2–5)
CO2 SERPL-SCNC: 26 MMOL/L (ref 23–29)
CREAT SERPL-MCNC: 0.9 MG/DL (ref 0.5–1.4)
EAG (OHS): 111 MG/DL (ref 68–131)
ERYTHROCYTE [DISTWIDTH] IN BLOOD BY AUTOMATED COUNT: 14.3 % (ref 11.5–14.5)
GFR SERPLBLD CREATININE-BSD FMLA CKD-EPI: >60 ML/MIN/1.73/M2
GLUCOSE SERPL-MCNC: 87 MG/DL (ref 70–110)
HBA1C MFR BLD: 5.5 % (ref 4–5.6)
HCT VFR BLD AUTO: 41.6 % (ref 37–48.5)
HDLC SERPL-MCNC: 59 MG/DL (ref 40–75)
HDLC SERPL: 21.8 % (ref 20–50)
HGB BLD-MCNC: 13.4 GM/DL (ref 12–16)
LDLC SERPL CALC-MCNC: 178 MG/DL (ref 63–159)
MCH RBC QN AUTO: 31.9 PG (ref 27–31)
MCHC RBC AUTO-ENTMCNC: 32.2 G/DL (ref 32–36)
MCV RBC AUTO: 99 FL (ref 82–98)
NONHDLC SERPL-MCNC: 212 MG/DL
PLATELET # BLD AUTO: 307 K/UL (ref 150–450)
PMV BLD AUTO: 9.5 FL (ref 9.2–12.9)
POTASSIUM SERPL-SCNC: 4.2 MMOL/L (ref 3.5–5.1)
PROT SERPL-MCNC: 7.1 GM/DL (ref 6–8.4)
RBC # BLD AUTO: 4.2 M/UL (ref 4–5.4)
SODIUM SERPL-SCNC: 140 MMOL/L (ref 136–145)
TRIGL SERPL-MCNC: 170 MG/DL (ref 30–150)
TSH SERPL-ACNC: 2.51 UIU/ML (ref 0.4–4)
WBC # BLD AUTO: 11.45 K/UL (ref 3.9–12.7)

## 2025-06-19 PROCEDURE — 85027 COMPLETE CBC AUTOMATED: CPT

## 2025-06-19 PROCEDURE — 83036 HEMOGLOBIN GLYCOSYLATED A1C: CPT

## 2025-06-19 PROCEDURE — 80061 LIPID PANEL: CPT

## 2025-06-19 PROCEDURE — 36415 COLL VENOUS BLD VENIPUNCTURE: CPT | Mod: PO

## 2025-06-19 PROCEDURE — 84443 ASSAY THYROID STIM HORMONE: CPT

## 2025-06-19 PROCEDURE — 80053 COMPREHEN METABOLIC PANEL: CPT

## 2025-06-19 PROCEDURE — 99999 PR PBB SHADOW E&M-EST. PATIENT-LVL V: CPT | Mod: PBBFAC,,, | Performed by: NURSE PRACTITIONER

## 2025-06-19 RX ORDER — PREDNISONE 5 MG/1
5 TABLET ORAL DAILY
Qty: 90 TABLET | Refills: 0 | Status: SHIPPED | OUTPATIENT
Start: 2025-06-19

## 2025-06-19 NOTE — ASSESSMENT & PLAN NOTE
Here for refills on prednisone. I will refill medication but strongly recommend she follow back up with endocrinology. She is agreeable. Referral to [facility] per request.

## 2025-06-19 NOTE — ASSESSMENT & PLAN NOTE
Update labs. Will consider zetia or statin if agreeable. Counseled on hyperlipidemia disease course, healthy diet and increased need for exercise. Please be advised of the risk of cardiovascular disease, increase stroke and heart attack risk with uncontrolled/untreated hyperlipidemia. Patient voiced understanding and understood the treatment plan. All questions were answered.

## 2025-06-19 NOTE — PROGRESS NOTES
Assessment/Plan:    1. Congenital adrenal hyperplasia  Overview:  Chronic.  Stable.  Patient reports compliance with prednisone 5 milligrams daily.  Patient reports that she was diagnosed in her teenage years with all the symptoms of CAH.  Patient had increased hair growth and lack of female characteristics.    Assessment & Plan:  Here for refills on prednisone. I will refill medication but strongly recommend she follow back up with endocrinology. She is agreeable. Referral to [facility] per request.     Orders:  -     predniSONE (DELTASONE) 5 MG tablet; Take 1 tablet (5 mg total) by mouth once daily.  Dispense: 90 tablet; Refill: 0  -     Cancel: Ambulatory referral/consult to Endocrinology; Future; Expected date: 06/26/2025  -     Ambulatory referral/consult to Endocrinology; Future; Expected date: 06/26/2025    2. Age-related osteoporosis without current pathological fracture  Overview:  DXA Bone Density Spine And Hip  Narrative: EXAMINATION:  DEXA BONE DENSITY SPINE HIP    CLINICAL HISTORY:  Asymptomatic menopausal state    TECHNIQUE:  DXA scanning was performed over the left hip and lumbar spine.  Review of the images confirms satisfactory positioning and technique.    COMPARISON:  None    FINDINGS:  The L1 to L4 vertebral bone mineral density is equal to 1.03 g/cm squared with a T score of -1.2.    The left femoral neck bone mineral density is equal to 0.65 g/cm squared with a T score of -2.8.    The total hip bone mineral density is equal to 0.64 g/cm squared with a T score of -2.9.  Impression: Osteoporosis    Consider FDA approved medical therapies in postmenopausal women and men aged 50 years and older, based on the following:    *A hip or vertebral (clinical or morphometric) fracture  *T score less than or equal to -2.5 at the femoral neck or spine after appropriate evaluation to exclude secondary causes.  *Low bone mass -- also known as osteopenia (T score between -1.0 and -2.5 at the femoral neck or spine)  and a 10 year probability of hip fracture greater than or equal to 3% or a 10 year probability of major osteoporosis-related fracture greater than or equal to 20% based on the [place]-adapted WHO algorithm.  *Clinicians judgment and/or patient preference may indicate treatment for people with 10 year fracture probabilities is above or below these levels.    Electronically signed by: Alberto Rubio MD  Date:    06/14/2022  Time:    08:26      Assessment & Plan:  DEXA due. She defers scheduling. Discussed risks vs benefit. She was on risedronate in the past. Reports stopped medication, could not tolerated. Consider prolia vs reclast. Recommend she follow back up with endocrinology.       3. Hyperlipidemia, unspecified hyperlipidemia type  Overview:  CHRONIC. STABLE. Lab analysis reviewed. She is not taking cholesterol medication.    (-) CP, SOB, abdominal pain, N/V/D, constipation, jaundice, skin changes.  (-) Myalgias  Lab Results   Component Value Date    CHOL 243 (H) 02/23/2024    CHOL 193 06/13/2023    CHOL 180 06/14/2022     Lab Results   Component Value Date    HDL 50 02/23/2024    HDL 54 06/13/2023    HDL 54 06/14/2022     Lab Results   Component Value Date    LDLCALC 164.6 (H) 02/23/2024    LDLCALC 111.0 06/13/2023    LDLCALC 100.2 06/14/2022     Lab Results   Component Value Date    TRIG 142 02/23/2024    TRIG 140 06/13/2023    TRIG 129 06/14/2022     Lab Results   Component Value Date    CHOLHDL 20.6 02/23/2024    CHOLHDL 28.0 06/13/2023    CHOLHDL 30.0 06/14/2022     Lab Results   Component Value Date    TOTALCHOLEST 4.9 02/23/2024    TOTALCHOLEST 3.6 06/13/2023    TOTALCHOLEST 3.3 06/14/2022     Lab Results   Component Value Date    ALT 10 02/23/2024    AST 19 02/23/2024    GGT 46 (H) 07/11/2023    ALKPHOS 63 02/23/2024    BILITOT 0.3 02/23/2024     ======================================================  The 10-year ASCVD risk score (Lj JERONIMO, et al., 2019) is: 9.9%    Values used to calculate the score:       Age: 69 years      Sex: Female      Is Non- : No      Diabetic: No      Tobacco smoker: No      Systolic Blood Pressure: 126 mmHg      Is BP treated: No      HDL Cholesterol: 50 mg/dL      Total Cholesterol: 243 mg/dL      Assessment & Plan:  Update labs. Will consider zetia or statin if agreeable. Counseled on hyperlipidemia disease course, healthy diet and increased need for exercise. Please be advised of the risk of cardiovascular disease, increase stroke and heart attack risk with uncontrolled/untreated hyperlipidemia. Patient voiced understanding and understood the treatment plan. All questions were answered.       4. Marijuana use  Overview:  Patient reports frequent marijuana use       5. Mammogram declined  Overview:  Patient is due for mammogram. She declines scheduling. Discussed risk vs benefit of screening.       6. Colon cancer screening declined  Overview:  Patient declines colon cancer screening by colonoscopy as well as alternative screening modalities. Discussed risk vs benefit of screening.       7. Encounter for long-term (current) use of medications  Overview:  CHRONIC. Stable. Compliant with medications for managed conditions. See medication list. No SE reported. Routine lab analysis is being monitored. Refills were addressed. Reviewed labs.    Lab Results   Component Value Date    WBC 9.34 02/23/2024    HGB 15.2 02/23/2024    HCT 45.4 02/23/2024    MCV 96 02/23/2024     02/23/2024         Chemistry        Component Value Date/Time     02/23/2024 0935    K 3.3 (L) 02/23/2024 0935     02/23/2024 0935    CO2 22 (L) 02/23/2024 0935    BUN 16 02/23/2024 0935    CREATININE 0.9 02/23/2024 0935     02/23/2024 0935        Component Value Date/Time    CALCIUM 9.9 02/23/2024 0935    ALKPHOS 63 02/23/2024 0935    AST 19 02/23/2024 0935    ALT 10 02/23/2024 0935    BILITOT 0.3 02/23/2024 0935    ESTGFRAFRICA >60.0 06/14/2022 0804    EGFRNONAA >60.0  06/14/2022 0804          Lab Results   Component Value Date    TSH 2.041 02/23/2024         Assessment & Plan:  Labs today. Complete history and physical was completed today.  Complete and thorough medication reconciliation was performed.  Discussed risks and benefits of medications.  Advised patient on orders and health maintenance.  We discussed old records and old labs if available.  Will request any records not available through epic.  Continue current medications listed on your summary sheet.    Orders:  -     TSH; Future; Expected date: 06/19/2025  -     Lipid Panel; Future; Expected date: 06/19/2025  -     Hemoglobin A1C; Future; Expected date: 06/19/2025  -     Comprehensive Metabolic Panel; Future; Expected date: 06/19/2025  -     CBC Without Differential; Future; Expected date: 06/19/2025      Follow up if symptoms worsen or fail to improve.  ER precautions for severe or worsening symptoms.     Tisha Serrano NP  _____________________________________________________________________________________________________________________________________________________    CC: follow up     HPI:  Patient is a 69-year-old female who presents in clinic today as an established patient here for follow up.    ENDOCRINE:  She was diagnosed with congenital adrenal hyperplasia (CAH) at age 17, which was emotionally traumatic due to difficulties with physical development, including menstrual irregularities and growth concerns. She continues prednisone 5 mg daily for management. She was followed by endocrinology in the past. She does not see an endocrinologist regularly anymore. Hers is in [place] and she reports transportation is difficult.     MEDICAL HISTORY:  She has a history of cholecystectomy. Labs from February 2024 showed elevated cholesterol, but she is not currently on cholesterol medication. Thyroid function, A1C, kidney function, liver function, and blood count were normal. She previously discontinued  "Actonel due to medication intolerance. She has osteoporosis.     FAMILY HISTORY:  Mother has history of alcohol use, COPD, and hypertension. Father has diabetes.     SOCIAL HISTORY:  She reports marijuana use but denies alcohol, tobacco, cigarettes, dipping, vaping, and other drug use.    Past Medical History:  Past Medical History:   Diagnosis Date    Adrenal hyperplasia, congenital      Past Surgical History:   Procedure Laterality Date    CHOLECYSTECTOMY  2008    COSMETIC SURGERY  1980    GALLBLADDER SURGERY N/A 2008    MOUTH SURGERY Right 2022     Review of patient's allergies indicates:  No Known Allergies  Social History[1]  Family History   Problem Relation Name Age of Onset    Alcohol abuse Mother Mom     COPD Mother Mom     Hypertension Mother Mom     Alcohol abuse Father Dad     COPD Father Dad     Diabetes Father Dad     No Known Problems Brother Ervin     No Known Problems Brother Pat     Other (substane abuse) Brother Harris     Cancer Brother Hilario         brain tumor     Medications Ordered Prior to Encounter[2]    Review of Systems   Constitutional:  Negative for appetite change, chills, fatigue and fever.   HENT:  Negative for congestion, rhinorrhea and sore throat.    Eyes:  Negative for visual disturbance.   Respiratory:  Negative for cough and shortness of breath.    Cardiovascular:  Negative for chest pain, palpitations and leg swelling.   Gastrointestinal:  Negative for abdominal pain, diarrhea and vomiting.   Genitourinary:  Negative for difficulty urinating, dysuria and hematuria.   Musculoskeletal:  Negative for arthralgias and myalgias.   Skin:  Negative for rash and wound.   Neurological:  Negative for dizziness and headaches.   Psychiatric/Behavioral:  Negative for behavioral problems. The patient is not nervous/anxious.      Vitals:    06/19/25 0924   BP: 126/70   Pulse: 82   Resp: 16   Temp: 97.9 °F (36.6 °C)   TempSrc: Oral   SpO2: 99%   Weight: 46.9 kg (103 lb 6.4 oz)   Height: 5' 2" " (1.575 m)     Wt Readings from Last 3 Encounters:   06/19/25 46.9 kg (103 lb 6.4 oz)   02/23/24 46.1 kg (101 lb 9.6 oz)   07/24/23 47.9 kg (105 lb 9.6 oz)     Physical Exam  Vitals reviewed.   Constitutional:       General: She is not in acute distress.     Appearance: Normal appearance. She is not ill-appearing.   HENT:      Head: Normocephalic and atraumatic.      Right Ear: External ear normal.      Left Ear: External ear normal.      Nose: Nose normal.   Eyes:      Extraocular Movements: Extraocular movements intact.      Conjunctiva/sclera: Conjunctivae normal.   Cardiovascular:      Rate and Rhythm: Normal rate.      Heart sounds: Normal heart sounds.   Pulmonary:      Effort: Pulmonary effort is normal. No respiratory distress.      Breath sounds: Normal breath sounds.   Abdominal:      General: Abdomen is flat. There is no distension.   Musculoskeletal:         General: Normal range of motion.      Cervical back: Normal range of motion.      Right lower leg: No edema.      Left lower leg: No edema.   Skin:     General: Skin is warm and dry.      Capillary Refill: Capillary refill takes less than 2 seconds.      Coloration: Skin is not pale.      Findings: No rash.   Neurological:      General: No focal deficit present.      Mental Status: She is alert and oriented to person, place, and time. Mental status is at baseline.      Motor: No weakness.      Gait: Gait normal.   Psychiatric:         Attention and Perception: She is attentive.         Mood and Affect: Mood normal. Mood is not anxious, depressed or elated. Affect is not labile, blunt, flat, angry or tearful.         Speech: Speech normal. She is communicative. Speech is not rapid and pressured, delayed or slurred.         Behavior: Behavior normal. Behavior is not agitated, slowed, aggressive, withdrawn, hyperactive or combative. Behavior is cooperative.         Thought Content: Thought content normal.         Judgment: Judgment normal.       Health  Maintenance   Topic Date Due    Mammogram  Never done    Pneumococcal Vaccines (Age 50+) (1 of 2 - PCV) Never done    Shingles Vaccine (1 of 2) Never done    RSV Vaccine (Age 60+ and Pregnant patients) (1 - Risk 60-74 years 1-dose series) Never done    COVID-19 Vaccine (1 - 2024-25 season) Never done    DEXA Scan  06/14/2025    Lipid Panel  02/23/2029    TETANUS VACCINE  06/13/2032    Hepatitis C Screening  Completed    Influenza Vaccine  Discontinued    Colorectal Cancer Screening  Discontinued   Visit today included increased complexity associated with the care of the episodic problem - see above- addressed and managing the longitudinal care of the patient due to the serious and/or complex managed problem(s) - see above.    This note was generated with the assistance of ambient listening technology. Verbal consent was obtained by the patient and accompanying visitor(s) for the recording of patient appointment to facilitate this note. I attest to having reviewed and edited the generated note for accuracy, though some syntax or spelling errors may persist. Please contact the author of this note for any clarification.       [1]   Social History  Tobacco Use    Smoking status: Former    Smokeless tobacco: Never   Substance Use Topics    Alcohol use: Never    Drug use: Yes     Frequency: 7.0 times per week     Types: Marijuana   [2]   Current Outpatient Medications on File Prior to Visit   Medication Sig Dispense Refill    multivitamin capsule Take 1 capsule by mouth once daily.      [DISCONTINUED] predniSONE (DELTASONE) 5 MG tablet TAKE 1 TABLET BY MOUTH ONCE DAILY 90 tablet 4    [DISCONTINUED] risedronate (ACTONEL) 150 MG Tab Take one tablet monthly on an empty stomach, with 8 ounces of water. Remain upright and avoid food or drink for 45 minutes after taking this drug. (Patient not taking: Reported on 6/19/2025) 1 tablet 11     No current facility-administered medications on file prior to visit.

## 2025-06-19 NOTE — ASSESSMENT & PLAN NOTE
DEXA due. She defers scheduling. Discussed risks vs benefit. She was on risedronate in the past. Reports stopped medication, could not tolerated. Consider prolia vs reclast. Recommend she follow back up with endocrinology.

## 2025-06-24 ENCOUNTER — RESULTS FOLLOW-UP (OUTPATIENT)
Dept: FAMILY MEDICINE | Facility: CLINIC | Age: 70
End: 2025-06-24

## 2025-06-24 DIAGNOSIS — E78.5 HYPERLIPIDEMIA, UNSPECIFIED HYPERLIPIDEMIA TYPE: Primary | ICD-10-CM

## 2025-06-24 RX ORDER — ATORVASTATIN CALCIUM 20 MG/1
20 TABLET, FILM COATED ORAL DAILY
Qty: 90 TABLET | Refills: 3 | Status: SHIPPED | OUTPATIENT
Start: 2025-06-24 | End: 2026-06-24

## 2025-06-25 DIAGNOSIS — Z78.0 MENOPAUSE: ICD-10-CM
